# Patient Record
Sex: MALE | Race: WHITE | NOT HISPANIC OR LATINO | Employment: UNEMPLOYED | URBAN - METROPOLITAN AREA
[De-identification: names, ages, dates, MRNs, and addresses within clinical notes are randomized per-mention and may not be internally consistent; named-entity substitution may affect disease eponyms.]

---

## 2017-04-10 ENCOUNTER — TRANSFERRED RECORDS (OUTPATIENT)
Dept: HEALTH INFORMATION MANAGEMENT | Facility: CLINIC | Age: 47
End: 2017-04-10

## 2017-05-02 ENCOUNTER — TRANSFERRED RECORDS (OUTPATIENT)
Dept: HEALTH INFORMATION MANAGEMENT | Facility: CLINIC | Age: 47
End: 2017-05-02

## 2017-05-15 ENCOUNTER — HOSPITAL ENCOUNTER (INPATIENT)
Facility: CLINIC | Age: 47
LOS: 3 days | Discharge: HOME | End: 2017-05-18
Attending: INTERNAL MEDICINE | Admitting: INTERNAL MEDICINE
Payer: MEDICAID

## 2017-05-15 DIAGNOSIS — G40.909 SEIZURE DISORDER (H): Primary | ICD-10-CM

## 2017-05-15 LAB
ALBUMIN SERPL-MCNC: 3.7 G/DL (ref 3.4–5)
ALBUMIN UR-MCNC: NEGATIVE MG/DL
ALP SERPL-CCNC: 67 U/L (ref 40–150)
ALT SERPL W P-5'-P-CCNC: 24 U/L (ref 0–70)
ANION GAP SERPL CALCULATED.3IONS-SCNC: 5 MMOL/L (ref 3–14)
APPEARANCE UR: CLEAR
AST SERPL W P-5'-P-CCNC: 14 U/L (ref 0–45)
BILIRUB SERPL-MCNC: 0.3 MG/DL (ref 0.2–1.3)
BILIRUB UR QL STRIP: NEGATIVE
BUN SERPL-MCNC: 19 MG/DL (ref 7–30)
CALCIUM SERPL-MCNC: 9.1 MG/DL (ref 8.5–10.1)
CHLORIDE SERPL-SCNC: 108 MMOL/L (ref 94–109)
CO2 SERPL-SCNC: 30 MMOL/L (ref 20–32)
COLOR UR AUTO: YELLOW
CREAT SERPL-MCNC: 1.09 MG/DL (ref 0.66–1.25)
ERYTHROCYTE [DISTWIDTH] IN BLOOD BY AUTOMATED COUNT: 12.2 % (ref 10–15)
GFR SERPL CREATININE-BSD FRML MDRD: 72 ML/MIN/1.7M2
GLUCOSE SERPL-MCNC: 83 MG/DL (ref 70–99)
GLUCOSE UR STRIP-MCNC: NEGATIVE MG/DL
HCT VFR BLD AUTO: 40.3 % (ref 40–53)
HGB BLD-MCNC: 14.2 G/DL (ref 13.3–17.7)
HGB UR QL STRIP: NEGATIVE
KETONES UR STRIP-MCNC: NEGATIVE MG/DL
LEUKOCYTE ESTERASE UR QL STRIP: NEGATIVE
MCH RBC QN AUTO: 31.8 PG (ref 26.5–33)
MCHC RBC AUTO-ENTMCNC: 35.2 G/DL (ref 31.5–36.5)
MCV RBC AUTO: 90 FL (ref 78–100)
MUCOUS THREADS #/AREA URNS LPF: PRESENT /LPF
NITRATE UR QL: NEGATIVE
PH UR STRIP: 6.5 PH (ref 5–7)
PLATELET # BLD AUTO: 179 10E9/L (ref 150–450)
POTASSIUM SERPL-SCNC: 3.7 MMOL/L (ref 3.4–5.3)
PROT SERPL-MCNC: 7.2 G/DL (ref 6.8–8.8)
RBC # BLD AUTO: 4.46 10E12/L (ref 4.4–5.9)
RBC #/AREA URNS AUTO: 0 /HPF (ref 0–2)
SODIUM SERPL-SCNC: 143 MMOL/L (ref 133–144)
SP GR UR STRIP: 1.01 (ref 1–1.03)
URN SPEC COLLECT METH UR: ABNORMAL
UROBILINOGEN UR STRIP-MCNC: NORMAL MG/DL (ref 0–2)
WBC # BLD AUTO: 5.9 10E9/L (ref 4–11)
WBC #/AREA URNS AUTO: <1 /HPF (ref 0–2)

## 2017-05-15 PROCEDURE — 36415 COLL VENOUS BLD VENIPUNCTURE: CPT | Performed by: PHYSICIAN ASSISTANT

## 2017-05-15 PROCEDURE — 80175 DRUG SCREEN QUAN LAMOTRIGINE: CPT | Performed by: PSYCHIATRY & NEUROLOGY

## 2017-05-15 PROCEDURE — 81001 URINALYSIS AUTO W/SCOPE: CPT | Performed by: INTERNAL MEDICINE

## 2017-05-15 PROCEDURE — 12000007 ZZH R&B INTERMEDIATE

## 2017-05-15 PROCEDURE — 25000132 ZZH RX MED GY IP 250 OP 250 PS 637: Performed by: PSYCHIATRY & NEUROLOGY

## 2017-05-15 PROCEDURE — 36415 COLL VENOUS BLD VENIPUNCTURE: CPT | Performed by: PSYCHIATRY & NEUROLOGY

## 2017-05-15 PROCEDURE — 95951 ZZHC EEG VIDEO EACH 24 HR: CPT

## 2017-05-15 PROCEDURE — 80053 COMPREHEN METABOLIC PANEL: CPT | Performed by: PHYSICIAN ASSISTANT

## 2017-05-15 PROCEDURE — 40000061 ZZH STATISTIC EEG TIME EA 10 MIN

## 2017-05-15 PROCEDURE — 99207 ZZC APP CREDIT; MD BILLING SHARED VISIT: CPT | Performed by: PHYSICIAN ASSISTANT

## 2017-05-15 PROCEDURE — 85027 COMPLETE CBC AUTOMATED: CPT | Performed by: PHYSICIAN ASSISTANT

## 2017-05-15 PROCEDURE — 80177 DRUG SCRN QUAN LEVETIRACETAM: CPT | Performed by: PSYCHIATRY & NEUROLOGY

## 2017-05-15 PROCEDURE — 99222 1ST HOSP IP/OBS MODERATE 55: CPT | Mod: AI | Performed by: INTERNAL MEDICINE

## 2017-05-15 RX ORDER — LAMOTRIGINE 100 MG/1
200 TABLET ORAL 2 TIMES DAILY
Status: DISCONTINUED | OUTPATIENT
Start: 2017-05-15 | End: 2017-05-17

## 2017-05-15 RX ORDER — LAMOTRIGINE 100 MG/1
300 TABLET ORAL 2 TIMES DAILY
Status: DISCONTINUED | OUTPATIENT
Start: 2017-05-15 | End: 2017-05-15

## 2017-05-15 RX ORDER — ONDANSETRON 2 MG/ML
4 INJECTION INTRAMUSCULAR; INTRAVENOUS EVERY 6 HOURS PRN
Status: DISCONTINUED | OUTPATIENT
Start: 2017-05-15 | End: 2017-05-18 | Stop reason: HOSPADM

## 2017-05-15 RX ORDER — LIDOCAINE 40 MG/G
CREAM TOPICAL
Status: DISCONTINUED | OUTPATIENT
Start: 2017-05-15 | End: 2017-05-18 | Stop reason: HOSPADM

## 2017-05-15 RX ORDER — ONDANSETRON 4 MG/1
4 TABLET, ORALLY DISINTEGRATING ORAL EVERY 6 HOURS PRN
Status: DISCONTINUED | OUTPATIENT
Start: 2017-05-15 | End: 2017-05-18 | Stop reason: HOSPADM

## 2017-05-15 RX ORDER — POLYETHYLENE GLYCOL 3350 17 G/17G
17 POWDER, FOR SOLUTION ORAL DAILY PRN
Status: DISCONTINUED | OUTPATIENT
Start: 2017-05-15 | End: 2017-05-18 | Stop reason: HOSPADM

## 2017-05-15 RX ORDER — PROCHLORPERAZINE 25 MG
25 SUPPOSITORY, RECTAL RECTAL EVERY 12 HOURS PRN
Status: DISCONTINUED | OUTPATIENT
Start: 2017-05-15 | End: 2017-05-18 | Stop reason: HOSPADM

## 2017-05-15 RX ORDER — ACETAMINOPHEN 325 MG/1
650 TABLET ORAL EVERY 4 HOURS PRN
Status: DISCONTINUED | OUTPATIENT
Start: 2017-05-15 | End: 2017-05-15

## 2017-05-15 RX ORDER — NALOXONE HYDROCHLORIDE 0.4 MG/ML
.1-.4 INJECTION, SOLUTION INTRAMUSCULAR; INTRAVENOUS; SUBCUTANEOUS
Status: DISCONTINUED | OUTPATIENT
Start: 2017-05-15 | End: 2017-05-18 | Stop reason: HOSPADM

## 2017-05-15 RX ORDER — LORAZEPAM 2 MG/ML
2 INJECTION INTRAMUSCULAR EVERY 5 MIN PRN
Status: DISCONTINUED | OUTPATIENT
Start: 2017-05-15 | End: 2017-05-18 | Stop reason: HOSPADM

## 2017-05-15 RX ORDER — ACETAMINOPHEN 650 MG/1
650 SUPPOSITORY RECTAL EVERY 4 HOURS PRN
Status: DISCONTINUED | OUTPATIENT
Start: 2017-05-15 | End: 2017-05-18 | Stop reason: HOSPADM

## 2017-05-15 RX ORDER — BISACODYL 10 MG
10 SUPPOSITORY, RECTAL RECTAL DAILY PRN
Status: DISCONTINUED | OUTPATIENT
Start: 2017-05-15 | End: 2017-05-18 | Stop reason: HOSPADM

## 2017-05-15 RX ORDER — LAMOTRIGINE 100 MG/1
200 TABLET ORAL
Status: DISCONTINUED | OUTPATIENT
Start: 2017-05-15 | End: 2017-05-15

## 2017-05-15 RX ORDER — LEVETIRACETAM 750 MG/1
750 TABLET ORAL 2 TIMES DAILY
Status: DISCONTINUED | OUTPATIENT
Start: 2017-05-15 | End: 2017-05-16

## 2017-05-15 RX ORDER — LIDOCAINE 40 MG/G
CREAM TOPICAL
Status: DISCONTINUED | OUTPATIENT
Start: 2017-05-15 | End: 2017-05-18

## 2017-05-15 RX ORDER — AMOXICILLIN 250 MG
1-2 CAPSULE ORAL 2 TIMES DAILY PRN
Status: DISCONTINUED | OUTPATIENT
Start: 2017-05-15 | End: 2017-05-18 | Stop reason: HOSPADM

## 2017-05-15 RX ORDER — ACETAMINOPHEN 325 MG/1
650 TABLET ORAL EVERY 4 HOURS PRN
Status: DISCONTINUED | OUTPATIENT
Start: 2017-05-15 | End: 2017-05-18 | Stop reason: HOSPADM

## 2017-05-15 RX ORDER — GINSENG 100 MG
CAPSULE ORAL 3 TIMES DAILY PRN
Status: DISCONTINUED | OUTPATIENT
Start: 2017-05-15 | End: 2017-05-18 | Stop reason: HOSPADM

## 2017-05-15 RX ORDER — LAMOTRIGINE 100 MG/1
300 TABLET ORAL AT BEDTIME
Status: DISCONTINUED | OUTPATIENT
Start: 2017-05-15 | End: 2017-05-18 | Stop reason: HOSPADM

## 2017-05-15 RX ORDER — PROCHLORPERAZINE MALEATE 5 MG
5-10 TABLET ORAL EVERY 6 HOURS PRN
Status: DISCONTINUED | OUTPATIENT
Start: 2017-05-15 | End: 2017-05-18 | Stop reason: HOSPADM

## 2017-05-15 RX ADMIN — LAMOTRIGINE 300 MG: 100 TABLET ORAL at 22:00

## 2017-05-15 RX ADMIN — LAMOTRIGINE 300 MG: 100 TABLET ORAL at 12:07

## 2017-05-15 RX ADMIN — LEVETIRACETAM 750 MG: 750 TABLET, FILM COATED ORAL at 22:00

## 2017-05-15 RX ADMIN — LAMOTRIGINE 200 MG: 100 TABLET ORAL at 16:37

## 2017-05-15 RX ADMIN — LEVETIRACETAM 750 MG: 750 TABLET, FILM COATED ORAL at 12:07

## 2017-05-15 ASSESSMENT — VISUAL ACUITY
OU: NORMAL ACUITY
OU: NORMAL ACUITY

## 2017-05-15 NOTE — IP AVS SNAPSHOT
[address] [facility]    [address]    [place] [address]    Phone:  [phone]                                       After Visit Summary   5/15/2017    Johnson Crystal    MRN: [id number]           After Visit Summary Signature Page     I have received my discharge instructions, and my questions have been answered. I have discussed any challenges I see with this plan with the nurse or doctor.    ..........................................................................................................................................  Patient/Patient Representative Signature      ..........................................................................................................................................  Patient Representative Print Name and Relationship to Patient    ..................................................               ................................................  Date                                            Time    ..........................................................................................................................................  Reviewed by Signature/Title    ...................................................              ..............................................  Date                                                            Time
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
## 2017-05-15 NOTE — CONSULTS
[facility]    Neurology Consultation     Date of Admission:  5/15/2017  Date of Consult (When I saw the patient): 05/15/17    Assessment & Plan   Johnson Crystal is a 46 year old male who was admitted on 5/15/2017. I was asked to see the patient for intractable epilepsy.    #. Seizures- Epilepsy monitoring  --Admit for continuous video/EEG monitoring  --BID hv, photic and exercise  --basic labs: normal CBC, CMP  --sleep deprivation: 4 hours of sleep a night- hold melatonin for tonight  --seizure precautions as per protocol  --home AEDs:  Will cut down his Keppra to 750 mg BID and decrease lamotrigine to 200/200/300 mgas a provoking factor.  --Tele monitoring: started: NSR thus far  --emergency AED treatment as per usual  --out of bed or chair only with assistance  --continuous pulse ox    #. DVT Prophylaxis  --SCDs and BID exercise  #. Nutrition / GI Prophylaxis  --regular diet      I discussed the above diagnosis, assessment, and further testing with the patient.  The patient is having medically uncontrolled seizures, which poses a significant mortality and morbidity risk. The epilepsy monitoring at [facility] is set up to monitor and record EEGs during seizures and it includes nurses trained to provide first aid and patient safety during seizures. To catch a sufficient number of seizures in a reasonable monitoring time, the patients medications are reduced and other factors are used to induce seizures, making the patient at risk for more severe seizures.The patient therefore is at risk for generalized tonic-clonic seizures and status epilepticus, a well-recognized medical emergency situation. The full hospital team must be present to deal with these medical emergency situations should they arise. The nurses must be present to document the patient's exam during and after the seizure so as to aid in clarifying the nature of the seizure disorder. Only in this way can the medical team hope to make  progress in determining the next steps in treatment. Left untreated, medically refractory seizures have a 10-20% annual morbidity and a 2% annual mortality rate, which makes video EEG monitoring the medically necessary next step for the care of this particular patient.      Florence Khan MD    Code Status    Full Code        Reason for Consult   Reason for consult: I was asked by Joanna Barthell, PA to evaluate this patient for medically intractable seizures.      Chief Complaint   Seizures and medication side effects    History is obtained from the patient, electronic chart    History of Present Illness   Johnson Crystal is a 46 year old male who presents with medically refractory epilepsy and seizure medication side effects.  He started having seizures around 9-10 years old.  These are generalized clonic-tonic seizures, usually without aura.  He has breakthrough seizures throughout his life despite trying multiple medications at high doses.  He currently take Keppra 1500 mg TID and Lamotrigine 300/200/300 mg.  Most recent dose increase was lamotrigine, and he does note side effects after taking his pills.  He also notes cognitive decline over the past few years- being forgetful mainly with short term memory.  Previous workup outpatient was normal EEG and MRI that showed a few areas of hemosiderin deposit- possibly from previous TBI.  He does have a history of a fall from 2nd or 3rd story when a kid.  Last known seizure was Jan 2017.    Goal for this admission is to determine focal vs generalized epilepsy disorder, and localization of seizures if possible.    Past Medical History   I have reviewed this patient's medical history and updated it with pertinent information if needed.   Epilepsy, cognitive decline    Past Surgical History   I have reviewed this patient's surgical history and updated it with pertinent information if needed.  Partial nephrectomy    Prior to Admission Medications   Prior to  Admission Medications   Prescriptions Last Dose Informant Patient Reported? Taking?   LAMOTRIGINE PO 5/15/2017 at 0300  Yes Yes   Sig: Take 200 mg by mouth 300 mg/1.5 tabs qam and hs, 200 mg/1 tab at 1400 daily.   LevETIRAcetam (KEPPRA PO) 5/14/2017 at hs  Yes Yes   Sig: Take 1,500 mg by mouth 3 times daily   MELATONIN PO Past Week at hs  Yes Yes   Sig: Take 5-10 mg by mouth      Facility-Administered Medications: None     Allergies   No Known Allergies    Social History   I have reviewed this patient's social history and updated it with pertinent information if needed. Johnson Crystal  Denies tobacco or alcohol.    Family History   I have reviewed this patient's family history and updated it with pertinent information if needed.   No epilepsy in the family.      Review of Systems   The 10 point Review of Systems is negative other than noted in the HPI.    Physical Exam   Temp: (P) 98.1  F (36.7  C) Temp src: (P) Oral BP: (P) 108/72 Pulse: (P) 51   Resp: (P) 20 SpO2: 95 % O2 Device: None (Room air)    Vital Signs with Ranges  Temp:  [98.1  F (36.7  C)-98.2  F (36.8  C)] (P) 98.1  F (36.7  C)  Pulse:  [51-61] (P) 51  Resp:  [20] (P) 20  BP: (130)/(84) (P) 108/72  SpO2:  [95 %] 95 %  192 lbs 9.6 oz    General Appearance:  No acute distress  Neuro:       Mental Status Exam:   A,A, NAD, fully oriented       Cranial Nerves:   CN 2-12 examined and intact       Motor:   5/5 BUE and BLE       Reflexes:  Normal BUE and BLE       Sensory:  Nl LT x4       Coordination:   fnf normal bilaterally       Gait:  Slightly wide based, otherwise normal  Neck: no nuchal rigidity. No carotid bruits.    Extremities: No clubbing, no cyanosis, no edema  CV: RRR nl s1, s2  Resp: CTAB        Data   Results for orders placed or performed during the hospital encounter of 05/15/17 (from the past 24 hour(s))   CBC with platelets   Result Value Ref Range    WBC 5.9 4.0 - 11.0 10e9/L    RBC Count 4.46 4.4 - 5.9 10e12/L    Hemoglobin 14.2 13.3 - 17.7  g/dL    Hematocrit 40.3 40.0 - 53.0 %    MCV 90 78 - 100 fl    MCH 31.8 26.5 - 33.0 pg    MCHC 35.2 31.5 - 36.5 g/dL    RDW 12.2 10.0 - 15.0 %    Platelet Count 179 150 - 450 10e9/L   Comprehensive metabolic panel   Result Value Ref Range    Sodium 143 133 - 144 mmol/L    Potassium 3.7 3.4 - 5.3 mmol/L    Chloride 108 94 - 109 mmol/L    Carbon Dioxide 30 20 - 32 mmol/L    Anion Gap 5 3 - 14 mmol/L    Glucose 83 70 - 99 mg/dL    Urea Nitrogen 19 7 - 30 mg/dL    Creatinine 1.09 0.66 - 1.25 mg/dL    GFR Estimate 72 >60 mL/min/1.7m2    GFR Estimate If Black 87 >60 mL/min/1.7m2    Calcium 9.1 8.5 - 10.1 mg/dL    Bilirubin Total 0.3 0.2 - 1.3 mg/dL    Albumin 3.7 3.4 - 5.0 g/dL    Protein Total 7.2 6.8 - 8.8 g/dL    Alkaline Phosphatase 67 40 - 150 U/L    ALT 24 0 - 70 U/L    AST 14 0 - 45 U/L           Imaging and procedures:  I personally reviewed these images.  Continuous video EEG: normal EEG so far

## 2017-05-15 NOTE — PHARMACY-ADMISSION MEDICATION HISTORY
Admission medication history interview status for the 5/15/2017  admission is complete. See EPIC admission navigator for prior to admission medications     Medication history source reliability:Good    Actions taken by pharmacist (provider contacted, etc): Copied from med bottles and interviewed pt     Additional medication history information not noted on PTA med list :None    Medication reconciliation/reorder completed by provider prior to medication history? No    Time spent in this activity: 15 min    Prior to Admission medications    Medication Sig Last Dose Taking? Auth Provider   LevETIRAcetam (KEPPRA PO) Take 1,500 mg by mouth 3 times daily 5/14/2017 at hs Yes Unknown, Entered By History   LAMOTRIGINE PO Take 200 mg by mouth 300 mg/1.5 tabs qam and hs, 200 mg/1 tab at 1400 daily. 5/15/2017 at 0300 Yes Unknown, Entered By History   MELATONIN PO Take 5-10 mg by mouth Past Week at hs Yes Unknown, Entered By History

## 2017-05-15 NOTE — PROGRESS NOTES
Day 1 LTV [id number] started on awake,alert and cooperative.  Photic and 3 min hv done.  Dr Khan ordering

## 2017-05-15 NOTE — PROGRESS NOTES
Patient here for EEG monitoring for seizures. A&O x4. Neuros intact. VSS. Tele NSR. Regular diet. Up with A1 + GB. Denies pain. Call put in to Neurologist for clarification on Keppra.  MD ordered 1-750mg tablet 2x/day and patients prescription bottle says to take 2-750mg tablets 3x/day. Continue to monitor and follow POC.
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
## 2017-05-15 NOTE — IP AVS SNAPSHOT
MRN:[id number]                      After Visit Summary   5/15/2017    Johnson Crystal    MRN: [id number]           Thank you!     Thank you for choosing [facility] for your care. Our goal is always to provide you with excellent care. Hearing back from our patients is one way we can continue to improve our services. Please take a few minutes to complete the written survey that you may receive in the mail after you visit with us. Thank you!        Patient Information     Date Of Birth          1970        Designated Caregiver       Most Recent Value    Caregiver    Will someone help with your care after discharge? no      About your hospital stay     You were admitted on:  May 15, 2017 You last received care in the:  [place] [address] [facility]    You were discharged on:  May 18, 2017        Reason for your hospital stay       Seizure monitoring                  Who to Call     For medical emergencies, please call 911.  For non-urgent questions about your medical care, please call your primary care provider or clinic, [phone]          Attending Provider     Provider Specialty    Akira Gauthier MD Internal Medicine       Primary Care Provider Office Phone # Fax #    Earl Covarrubias [phone]       [address] [address]        After Care Instructions     Diet       Follow this diet upon discharge: Orders Placed This Encounter      Regular Diet Adult                  Follow-up Appointments     Follow-up and recommended labs and tests        Follow up with primary care provider, EARL COVARRUBIAS, within 7 days to evaluate medication change.            Follow-up and recommended labs and tests        1) Follow up with Dr. Velasquez of [facility].   2) Follow up with Dr. Yana Narvaez of [facility]                  Your next 10 appointments already scheduled     May 30, 2017  9:30 AM CDT   EEG with ME  "[facility] EEG 3   [facility] ([facility])    [address], [address]  [facility] [address] [phone]            May 30, 2017  1:00 PM CDT   New Patient Visit with Leigh Ann Fong MD   [facility]    [address], [address]  [facility] [address] [phone]              Further instructions from your care team       Dr Velasquez's office at [facility] will call you to set up an appt for a abdominal CT and follow-up appointment. If you do not hear from them, call them at [phone].    Per Dr. 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     Follow-up with the [facility] in 1 month with Dr. Khan. Call [phone] to make an appointment.   Address: [address] [place] [address] [address], [place] [address]            Pending Results     No orders found from 5/13/2017 to 5/16/2017.            Statement of Approval     Ordered          05/18/17 1631  I have reviewed and agree with all the recommendations and orders detailed in this document.  EFFECTIVE NOW     Approved and electronically signed by:  Akira Gauthier MD             Admission Information     Date & Time Provider Department Dept. Phone    5/15/2017 Akira Gauthier MD [address] [facility] [place] [phone]      Your Vitals Were     Blood Pressure Pulse Temperature Respirations Height Weight    119/78 (BP Location: Right arm) 69 98  F (36.7  C) (Oral) 16 1.778 m (5' 10\") 87.4 kg (192 lb 9.6 oz)    Pulse Oximetry BMI (Body Mass Index)                97% 27.64 kg/m2          MyChart Information     [employer] lets you send messages to your doctor, view your test results, renew your prescriptions, schedule appointments and more. To sign up, go to [email or web address] . Click on \"Log in\" on the left side of the screen, which will take you to the Welcome page. Then click on \"Sign up Now\" " on the right side of the page.     You will be asked to enter the access code listed below, as well as some personal information. Please follow the directions to create your username and password.     Your access code is: [email or web address]  Expires: 2017 11:00 AM     Your access code will  in 90 days. If you need help or a new code, please call your [place] clinic or [phone].        Care EveryWhere ID     This is your Care EveryWhere ID. This could be used by other organizations to access your [place] medical records  [id number]           Review of your medicines      CONTINUE these medicines which may have CHANGED, or have new prescriptions. If we are uncertain of the size of tablets/capsules you have at home, strength may be listed as something that might have changed.        Dose / Directions    * lamoTRIgine 200 MG tablet   Commonly known as:  LaMICtal   This may have changed:    - medication strength  - when to take this  - additional instructions        Dose:  200 mg   Take 1 tablet (200 mg) by mouth 2 times daily   Quantity:  60 tablet   Refills:  0       * lamoTRIgine 150 MG tablet   Commonly known as:  LaMICtal   This may have changed:  You were already taking a medication with the same name, and this prescription was added. Make sure you understand how and when to take each.        Dose:  300 mg   Take 2 tablets (300 mg) by mouth At Bedtime   Quantity:  30 tablet   Refills:  0       * Notice:  This list has 2 medication(s) that are the same as other medications prescribed for you. Read the directions carefully, and ask your doctor or other care provider to review them with you.      CONTINUE these medicines which have NOT CHANGED        Dose / Directions    KEPPRA PO        Dose:  1500 mg   Take 1,500 mg by mouth 3 times daily   Refills:  0       MELATONIN PO   Notes to Patient:  Resume home med schedule        Dose:  5-10 mg   Take 5-10 mg by mouth   Refills:  0            Where to get your  medicines      These medications were sent to [facility] [place], [place] - [address]  [address] [place] [address]     Phone:  [phone]     lamoTRIgine 150 MG tablet    lamoTRIgine 200 MG tablet                Protect others around you: Learn how to safely use, store and throw away your medicines at www.disposemymeds.org.             Medication List: This is a list of all your medications and when to take them. Check marks below indicate your daily home schedule. Keep this list as a reference.      Medications           Morning Afternoon Evening Bedtime As Needed    KEPPRA PO   Take 1,500 mg by mouth 3 times daily   Last time this was given:  1,500 mg on 5/18/2017  4:00 PM   Next Dose Due:  5/18/17 at 10pm            9am       4pm           10pm           * lamoTRIgine 200 MG tablet   Commonly known as:  LaMICtal   Take 1 tablet (200 mg) by mouth 2 times daily   Last time this was given:  200 mg on 5/18/2017 12:21 PM   Next Dose Due:  5/19/17 at 8am            8am       2pm                   * lamoTRIgine 150 MG tablet   Commonly known as:  LaMICtal   Take 2 tablets (300 mg) by mouth At Bedtime   Last time this was given:  200 mg on 5/18/2017 12:21 PM   Next Dose Due:  5/18/17 at 9pm                        9pm           MELATONIN PO   Take 5-10 mg by mouth   Notes to Patient:  Resume home med schedule                                      * Notice:  This list has 2 medication(s) that are the same as other medications prescribed for you. Read the directions carefully, and ask your doctor or other care provider to review them with you.

## 2017-05-16 LAB
LAMOTRIGINE SERPL-MCNC: 12.8 UG/ML
LEVETIRACETAM SERPL-MCNC: 21 UG/ML

## 2017-05-16 PROCEDURE — 12000007 ZZH R&B INTERMEDIATE

## 2017-05-16 PROCEDURE — 95951 ZZHC EEG VIDEO EACH 24 HR: CPT

## 2017-05-16 PROCEDURE — 99231 SBSQ HOSP IP/OBS SF/LOW 25: CPT | Performed by: INTERNAL MEDICINE

## 2017-05-16 PROCEDURE — 25000132 ZZH RX MED GY IP 250 OP 250 PS 637: Performed by: PSYCHIATRY & NEUROLOGY

## 2017-05-16 PROCEDURE — 99207 ZZC CDG-MDM COMPONENT: MEETS LOW - DOWN CODED: CPT | Performed by: INTERNAL MEDICINE

## 2017-05-16 RX ADMIN — LEVETIRACETAM 750 MG: 750 TABLET, FILM COATED ORAL at 09:12

## 2017-05-16 RX ADMIN — LAMOTRIGINE 200 MG: 100 TABLET ORAL at 09:12

## 2017-05-16 RX ADMIN — LAMOTRIGINE 200 MG: 100 TABLET ORAL at 11:46

## 2017-05-16 RX ADMIN — LAMOTRIGINE 300 MG: 100 TABLET ORAL at 21:58

## 2017-05-16 ASSESSMENT — VISUAL ACUITY
OU: NORMAL ACUITY

## 2017-05-16 NOTE — PLAN OF CARE
Problem: Goal Outcome Summary  Goal: Goal Outcome Summary  Outcome: No Change  DA today for continuous EEG monitoring under Dr. Yana Narvaez. A&0x4, pt states he is forgetful and has ST memory loss at times. Neuros intact. No seizure activity this shift. Denies pain. Bedrest with BRP, up with SBA/GB. Tolerating a regular diet. Tele NSR. Informed pt of sleep deprivation overnight.

## 2017-05-16 NOTE — PLAN OF CARE
Problem: Goal Outcome Summary  Goal: Goal Outcome Summary  Outcome: No Change  Patient here for EEG monitoring for seizures. A&O x4. Neuros intact. VSS. Tele NSR. Regular diet. Up with A1 + GB. Denies pain. Per MD orders patient to be sleep deprived tonight. No events witnessed or reported on this shift. Continue to monitor and follow POC.
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
## 2017-05-16 NOTE — PLAN OF CARE
Problem: Goal Outcome Summary  Goal: Goal Outcome Summary  Outcome: Improving  Admitted for 24 hr EEG monitoring. VSS. A&O. Neuros intact, forgetful at times. No sz activity noted. Sz precautions in place. Tele SD. Regular diet. Bedrest with BR privileges, SBA. Denies pain. Only had 4 hours of sleep tonight. D/c pending. Will continue to monitor.
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
## 2017-05-17 LAB — LAMOTRIGINE FREE LEVEL: 4.4

## 2017-05-17 PROCEDURE — 12000007 ZZH R&B INTERMEDIATE

## 2017-05-17 PROCEDURE — 25000132 ZZH RX MED GY IP 250 OP 250 PS 637: Performed by: PSYCHIATRY & NEUROLOGY

## 2017-05-17 PROCEDURE — 99207 ZZC CDG-MDM COMPONENT: MEETS LOW - DOWN CODED: CPT | Performed by: INTERNAL MEDICINE

## 2017-05-17 PROCEDURE — 95951 ZZHC EEG VIDEO EACH 24 HR: CPT

## 2017-05-17 PROCEDURE — 40000061 ZZH STATISTIC EEG TIME EA 10 MIN

## 2017-05-17 PROCEDURE — 25000132 ZZH RX MED GY IP 250 OP 250 PS 637: Performed by: PHYSICIAN ASSISTANT

## 2017-05-17 PROCEDURE — 99231 SBSQ HOSP IP/OBS SF/LOW 25: CPT | Performed by: INTERNAL MEDICINE

## 2017-05-17 RX ORDER — LAMOTRIGINE 100 MG/1
200 TABLET ORAL 2 TIMES DAILY
Status: DISCONTINUED | OUTPATIENT
Start: 2017-05-17 | End: 2017-05-18 | Stop reason: HOSPADM

## 2017-05-17 RX ORDER — LAMOTRIGINE 100 MG/1
200 TABLET ORAL DAILY
Status: DISCONTINUED | OUTPATIENT
Start: 2017-05-18 | End: 2017-05-17

## 2017-05-17 RX ORDER — LEVETIRACETAM 750 MG/1
1500 TABLET ORAL 3 TIMES DAILY
Status: DISCONTINUED | OUTPATIENT
Start: 2017-05-17 | End: 2017-05-18 | Stop reason: HOSPADM

## 2017-05-17 RX ADMIN — LAMOTRIGINE 200 MG: 100 TABLET ORAL at 08:13

## 2017-05-17 RX ADMIN — LAMOTRIGINE 200 MG: 100 TABLET ORAL at 13:16

## 2017-05-17 RX ADMIN — LEVETIRACETAM 1500 MG: 750 TABLET, FILM COATED ORAL at 13:16

## 2017-05-17 RX ADMIN — ACETAMINOPHEN 650 MG: 325 TABLET, FILM COATED ORAL at 15:28

## 2017-05-17 RX ADMIN — LAMOTRIGINE 300 MG: 100 TABLET ORAL at 21:52

## 2017-05-17 RX ADMIN — LEVETIRACETAM 1500 MG: 750 TABLET, FILM COATED ORAL at 21:52

## 2017-05-17 RX ADMIN — LEVETIRACETAM 1500 MG: 750 TABLET, FILM COATED ORAL at 17:21

## 2017-05-17 ASSESSMENT — VISUAL ACUITY
OU: NORMAL ACUITY

## 2017-05-17 NOTE — PLAN OF CARE
Problem: Goal Outcome Summary  Goal: Goal Outcome Summary  Outcome: No Change  A&O times 4. Some short term memory loss noted otherwise neuros intact. VSS. Tele NSR. Regular diet. Up with assist of one. Voiding adequately in bathroom. Patient on video EEG monitoring. No seizure activity witnessed. Anti-seizure medications resumed this afternoon. Denies pain. Continue to monitor.
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
## 2017-05-17 NOTE — PROGRESS NOTES
[facility]    Oncology Progress Note    Date of Service (when I saw the patient): 05/17/2017     Assessment & Plan   Johnson Crystal is a 46 year old male who was admitted on 5/15/2017.      Left renal Oncocytoma  -s/p partial nephrectomy 9/2016  -has failed prior follow up but no clinical evidence of recurrence  -urinalysis without hematuria  -plan CT abdomen and follow up with me after discharge      Code Status: Full Code    lJ Velasquez    Interval History   No new concerns    Physical Exam   Temp: 97.3  F (36.3  C) Temp src: Axillary BP: 123/76 Pulse: 66   Resp: 16 SpO2: 96 % O2 Device: None (Room air)    Vitals:    05/15/17 0941   Weight: 87.4 kg (192 lb 9.6 oz)     Vital Signs with Ranges  Temp:  [97.3  F (36.3  C)-98.7  F (37.1  C)] 97.3  F (36.3  C)  Pulse:  [55-87] 66  Resp:  [16] 16  BP: (120-133)/(76-85) 123/76  SpO2:  [94 %-96 %] 96 %  I/O last 3 completed shifts:  In: 480 [P.O.:480]  Out: -     Constitutional: awake, alert, cooperative, no apparent distress    Medications        [START ON 5/18/2017] lamoTRIgine  200 mg Oral Daily     sodium chloride (PF)  3 mL Intracatheter Q8H     lamoTRIgine  300 mg Oral At Bedtime       Data   No results found for this or any previous visit (from the past 24 hour(s)).

## 2017-05-17 NOTE — PROGRESS NOTES
[facility]    Neurology Progress Note    Date of Service (when I saw the patient): 05/17/2017     Today's developments: Normal EEG so far.  No auras or seizures.  Stopped Keppra last night.  Will skip mid-day dose of lamotrigine tonight.  Continue monitoring.    Assessment & Plan   Johnson Crystal is a 46 year old male who was admitted on 5/15/2017. I was asked to see the patient for intractable epilepsy.      #. Seizures- Epilepsy monitoring  --Continuous video/EEG monitoring  --BID hv, photic and exercise  --basic labs: normal CBC, CMP on admission  --stop sleep deprivation- patient can sleep when he wishes.  --seizure precautions as per protocol  --home AEDs: Stopped Keppra, decreased lamotrigine to 200/0/300 mg as a provoking factor.  Plan for tomorrow restart full dose PTA meds by noon- will consider decreasing lmt a little to 200/200/300 though for patient tolerance  --Tele monitoring: NSR thus far  --emergency AED treatment as per usual  --out of bed or chair only with assistance  --continuous pulse ox      #. DVT Prophylaxis  --SCDs and BID exercise  #. Nutrition / GI Prophylaxis  --regular diet          I discussed the above diagnosis, assessment, and further testing with the patient.  The patient is having medically uncontrolled seizures, which poses a significant mortality and morbidity risk. The epilepsy monitoring at [facility] is set up to monitor and record EEGs during seizures and it includes nurses trained to provide first aid and patient safety during seizures. To catch a sufficient number of seizures in a reasonable monitoring time, the patients medications are reduced and other factors are used to induce seizures, making the patient at risk for more severe seizures.The patient therefore is at risk for generalized tonic-clonic seizures and status epilepticus, a well-recognized medical emergency situation. The full hospital team must be present to deal with these medical emergency  situations should they arise. The nurses must be present to document the patient's exam during and after the seizure so as to aid in clarifying the nature of the seizure disorder. Only in this way can the medical team hope to make progress in determining the next steps in treatment. Left untreated, medically refractory seizures have a 10-20% annual morbidity and a 2% annual mortality rate, which makes video EEG monitoring the medically necessary next step for the care of this particular patient.      Florence Khan MD       Interval History   Johnson denies any auras or seizures.  He is comfortable with monitoring and the set up in the hospital.  No pain.  No neuro changes.    Physical Exam   Temp: 97.3  F (36.3  C) Temp src: Axillary BP: 123/76 Pulse: 66   Resp: 16 SpO2: 96 % O2 Device: None (Room air)    Vitals:    05/15/17 0941   Weight: 87.4 kg (192 lb 9.6 oz)     Vital Signs with Ranges  Temp:  [97.3  F (36.3  C)-98.7  F (37.1  C)] 97.3  F (36.3  C)  Pulse:  [55-87] 66  Resp:  [16] 16  BP: (120-133)/(76-85) 123/76  SpO2:  [94 %-96 %] 96 %  I/O last 3 completed shifts:  In: 480 [P.O.:480]  Out: -       General Appearance:  No acute distress  Neuro:       Mental Status Exam:   A,A, fully oriented       Cranial Nerves:  EOMI, face equal and symmetric, speech normal       Motor:   5/5 BUE and BLE       Sensory:  Nl LT x4       Coordination:  fnf normal bilaterally  CV: RRR nl s1, s2  Resp: CTAB    Extremities: No clubbing, no cyanosis, no edema    Medications        [START ON 5/18/2017] lamoTRIgine  200 mg Oral Daily     sodium chloride (PF)  3 mL Intracatheter Q8H     lamoTRIgine  300 mg Oral At Bedtime       Data   No results found for this or any previous visit (from the past 24 hour(s)).      Images and Procedures:  I personally reviewed the images of the following studies:  Prolonged video-EEG: normal EEG so far.

## 2017-05-17 NOTE — PROGRESS NOTES
Day 2 LTV completed O41-030F  And Day 3 LTV M58-708I started with photic, 3min HV and exercise.  Dr Khan ordering.

## 2017-05-17 NOTE — PROCEDURES
LONG TERM ELECTROENCEPHALOGRAM WITH VIDEO       ORDERING PHYSICIAN:  Dr. Khan      [facility] EEG # [id number] LTV -- Day 1, started on 05/15/2017, stopped 2017       This is a continuous video-EEG performed on Lucien Crystal, in the standard International 10-20 electrode system.  The background activity during wakefulness is mainly in the alpha frequency and is symmetric.  There is a posterior dominant rhythm of 11 Hz bilaterally with quiet wakefulness and eye closure.  Drowsiness was recorded, with dropout of posterior dominant rhythm and increased theta frequency activity.  Sleep was recorded with normal POSTS, vertex waves, and K complexes.  Photic stimulation and hyperventilation and bedside exercise was done and no abnormalities occurred during the seizures.  The patient did not have any clinical seizures, auras, or unusual sensations during the recording.      IMPRESSION:  This is a normal EEG during wakefulness, drowsiness and sleep.  The patient did not have any clinical seizures, auras and no epileptiform discharges or seizures occurred on the EEG recording.  The patient was agreeable to remain another day for continued monitoring.         FREDI KHAN MD             D: 2017 13:20   T: 2017 06:56   MT: dimitri      Name:     LUCIEN CRYSTAL   MRN:      [id number]        Account:        [id number]   :      1970           Procedure Date: 05/15/2017      Document: [id number]

## 2017-05-17 NOTE — PLAN OF CARE
Problem: Goal Outcome Summary  Goal: Goal Outcome Summary  Outcome: No Change  Pt a/ox4 with STM loss/forgetfulness noted. CMS and neuros intact. On tele in SR. Sz precautions with no sz activity noted. EEG tech preformed last daily photic, HV and exercise at shift change. 24h EEG remains in place. Up with SBA. Denies pain. Plan for last night of 4h sleep over NOC, pt aware and compliant with sleep restrictions. D/c plan pending.

## 2017-05-17 NOTE — PLAN OF CARE
Problem: Goal Outcome Summary  Goal: Goal Outcome Summary  Outcome: Improving  A&Ox4. Neuros intact. VSS on RA. Tele NSR. Regular diet. Up with SBA.  Voiding adequately in BR.  No BM this shift.  Denies pain.  Seizure precautions, no seizure activity noted this shift.  Continue to monitor.

## 2017-05-18 VITALS
HEART RATE: 69 BPM | OXYGEN SATURATION: 97 % | RESPIRATION RATE: 16 BRPM | HEIGHT: 70 IN | TEMPERATURE: 98 F | BODY MASS INDEX: 27.57 KG/M2 | SYSTOLIC BLOOD PRESSURE: 119 MMHG | WEIGHT: 192.6 LBS | DIASTOLIC BLOOD PRESSURE: 78 MMHG

## 2017-05-18 PROCEDURE — 40000061 ZZH STATISTIC EEG TIME EA 10 MIN

## 2017-05-18 PROCEDURE — 99239 HOSP IP/OBS DSCHRG MGMT >30: CPT | Performed by: INTERNAL MEDICINE

## 2017-05-18 PROCEDURE — 25000128 H RX IP 250 OP 636: Performed by: PSYCHIATRY & NEUROLOGY

## 2017-05-18 PROCEDURE — 25000132 ZZH RX MED GY IP 250 OP 250 PS 637: Performed by: PSYCHIATRY & NEUROLOGY

## 2017-05-18 RX ORDER — LAMOTRIGINE 150 MG/1
300 TABLET ORAL AT BEDTIME
Qty: 30 TABLET | Refills: 0 | Status: SHIPPED | OUTPATIENT
Start: 2017-05-18 | End: 2017-05-30

## 2017-05-18 RX ORDER — LAMOTRIGINE 200 MG/1
200 TABLET ORAL 2 TIMES DAILY
Qty: 60 TABLET | Refills: 0 | Status: SHIPPED | OUTPATIENT
Start: 2017-05-18 | End: 2017-05-30

## 2017-05-18 RX ADMIN — LAMOTRIGINE 200 MG: 100 TABLET ORAL at 08:06

## 2017-05-18 RX ADMIN — LEVETIRACETAM 1500 MG: 100 INJECTION, SOLUTION INTRAVENOUS at 08:58

## 2017-05-18 RX ADMIN — LEVETIRACETAM 1500 MG: 750 TABLET, FILM COATED ORAL at 08:06

## 2017-05-18 RX ADMIN — LEVETIRACETAM 1500 MG: 750 TABLET, FILM COATED ORAL at 16:00

## 2017-05-18 RX ADMIN — LAMOTRIGINE 200 MG: 100 TABLET ORAL at 12:21

## 2017-05-18 ASSESSMENT — VISUAL ACUITY
OU: NORMAL ACUITY

## 2017-05-18 NOTE — PLAN OF CARE
Problem: Goal Outcome Summary  Goal: Goal Outcome Summary  Outcome: Improving  A&O, forgetful. Neuros intact. VSS. Tele NSR. regular diet. Up with SBA. szr precautions, no szr activity noted. Denies pain. Plan d/c EEG at 1500 and for pt to d/c home at 1800 with wife.         Pt d/c'd home. Will have f/u appts with neuro, oncology and PCP. Sent with new dose of Lamictal medication. All d/c questions answered.

## 2017-05-18 NOTE — DISCHARGE INSTRUCTIONS
Dr Velasquez's office at [facility] will call you to set up an appt for a abdominal CT and follow-up appointment. If you do not hear from them, call them at [phone].    Per Dr. 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     Follow-up with the [facility] in 1 month with Dr. Khan. Call [phone] to make an appointment.   Address: [address] [place] [address] [address], [place] [address]

## 2017-05-18 NOTE — PROCEDURES
LONG TERM ELECTROENCEPHALOGRAM WITH VIDEO       ORDERING PHYSICIAN:  Fredi Khan MD      [facility] EEG # [id number], Day 3 started on 2017, stopped 2017.        This is a digital continuous video-EEG done in the standard 10-20 electrode system with  additional subtemporal leads.  The background activity during wakefulness is mainly in the alpha frequency and is symmetric.  There is posterior dominant rhythm of 9 Hz bilaterally with quiet wakefulness with eye closure.  No significant abnormalities occurred during wakefulness.  The patient, Johnson Crystal, does become drowsy with dropout of the posterior dominant rhythm and increased theta frequency activity.  During sleep activity, there were generalized spike and wave discharges along with generalized polyspike and wave discharges that tended to be maximal bifrontal, but had a wide, generalized field.  There was no clinical correlation to these discharges.  Some of them occurred singly, some of them in couplets and rarely some of them in a continued discharge lasting up to 2 seconds.  In the run of discharges, the frequency was about 3-4 Hz.  The patient did not have any clinical seizures.  There were no electrographic discharges greater than 2 seconds.      IMPRESSION:  This is an abnormal EEG due to generalized spike and wave and polyspike and wave discharges.  These occurred during sleep.  This is consistent with a generalized seizure disorder.  No clinical correlation occurred with these discharges.  None of the discharges lasted more than 2 seconds.  No other abnormalities occurred.  No focal abnormalities are present.  The patient's medications were restarted on this day.         FREDI KHAN MD             D: 2017 07:29   T: 2017 11:16   MT: dimitri      Name:     JOHNSON CRYSTAL   MRN:      [id number]        Account:        [id number]   :      1970           Procedure Date: 2017      Document: [id number]

## 2017-05-18 NOTE — PROCEDURES
LONG TERM ELECTROENCEPHALOGRAM WITH VIDEO       ORDERING PHYSICIAN:  Fredi Khan MD      [facility] EEG # [id number], Day 2 started on 2017, stopped on 2017        This is a digital continuous video-EEG performed in the standard International 10-20 electrode system on Johnson Crystal.  Additional subtemporal leads were recorded.  The background activity during wakefulness is mainly alpha frequency and is symmetric.  There is a posterior dominant rhythm of 9-10 Hz bilaterally during quiet wakefulness with eye closure.  Towards the end of the recording, there were 2 individual generalized spike and wave discharges, maximal bifrontal.  No other epileptiform activity or abnormalities were seen.  The patient did become drowsy with dropout of posterior dominant rhythm and increased theta frequency activity.  Sleep was recorded with normal POSTS, vertex waves, and K complexes.  Hyperventilation and photic stimulation and exercise were performed twice each day and no additional abnormalities were seen during those procedures.      IMPRESSION:  This is an abnormal EEG due to 2 individual generalized spike and wave discharges, maximal bifrontal towards the end of the recording period.  No other abnormalities were seen.  The patient did not experience any seizures or auras during the recording.  No electrographic seizures occurred.  The patient was agreeable for continued monitoring.         FREDI KHAN MD             D: 2017 16:16   T: 2017 09:12   MT: dimitri      Name:     JOHNSON CRYSTAL   MRN:      [id number]        Account:        [id number]   :      1970           Procedure Date: 2017      Document: [id number]

## 2017-05-18 NOTE — PROGRESS NOTES
Oncology chart check for Dr. Velasquez:    No new recommendations.  Please see Dr. Velasquez's note from 5/17/2017.  Plan is for outpatient CT abdomen and to follow up with Dr. Velasquez with the CT results.  Please call with questions, if any.

## 2017-05-18 NOTE — PLAN OF CARE
Problem: Goal Outcome Summary  Goal: Goal Outcome Summary  Outcome: Improving  Admitted for 24 hr EEG monitoring. A&O. VSS. Neuros intact except forgetful at times. Denies numbness/tingling. Tele NSR. Regular diet. Up SBA. Voiding in BR. Denies pain. D/c pending. Will continue to monitor.

## 2017-05-18 NOTE — PROGRESS NOTES
[facility]    Neurology Progress Note    Date of Service (when I saw the patient): 05/18/2017     Today's developments: generalized spike and wave discharges during sleep- consistent with a generalized seizure disorder.  With this finding, a clinical seizure would not give much more information, so home dose medications were restarted, with slight decrease in lamotrigine dose (take away 100 mg in the am), due to patient side effects.  Likely ok to d/c today- will give loading dose Keppra to cover him given the few days completely off Keppra.  I will bring in letter for back to work in one week, and card for program for people with epilepsy.    I will monitor EEG today- will give ok to go home if EEG is looking ok around 2-3 pm today.      Assessment & Plan   Johnson Crystal is a 46 year old male who was admitted on 5/15/2017. I was asked to see the patient for intractable epilepsy.      #. Seizures- Epilepsy monitoring  --Continuous video/EEG monitoring- shows generalized spike and wave and poly spike and wave discharges  --Can stop BID photic and HV.  Do continue BID exercise  --basic labs: normal CBC, CMP on admission  --stop sleep deprivation- patient can sleep when he wishes.  --seizure precautions as per protocol  --home AEDs: Stopped Keppra, restarted full dose PTA meds on 5-17 at noon- will consider decreasing lmt a little to 200/200/300 though for patient tolerance  --Tele monitoring: NSR thus far  --emergency AED treatment as per usual  --out of bed or chair only with assistance  --continuous pulse ox      #. DVT Prophylaxis  --SCDs and BID exercise  #. Nutrition / GI Prophylaxis  --regular diet          I discussed the above diagnosis, assessment, and further testing with the patient.    The patient is having medically uncontrolled seizures, which poses a significant mortality and morbidity risk. The epilepsy monitoring at [facility] is set up to monitor and record EEGs during seizures and it  includes nurses trained to provide first aid and patient safety during seizures. To catch a sufficient number of seizures in a reasonable monitoring time, the patients medications are reduced and other factors are used to induce seizures, making the patient at risk for more severe seizures.The patient therefore is at risk for generalized tonic-clonic seizures and status epilepticus, a well-recognized medical emergency situation. The full hospital team must be present to deal with these medical emergency situations should they arise. The nurses must be present to document the patient's exam during and after the seizure so as to aid in clarifying the nature of the seizure disorder. Only in this way can the medical team hope to make progress in determining the next steps in treatment. Left untreated, medically refractory seizures have a 10-20% annual morbidity and a 2% annual mortality rate, which makes video EEG monitoring the medically necessary next step for the care of this particular patient.    Total time 35 minutes with over 50% face to face counseling and coordination of care.      Florence Khan MD          Interval History    No complaints, no pain, no clinical seizures or auras.    Physical Exam   Temp: 97.7  F (36.5  C) Temp src: Oral BP: 114/75 Pulse: 58   Resp: 14 SpO2: 98 % O2 Device: None (Room air)    Vitals:    05/15/17 0941   Weight: 87.4 kg (192 lb 9.6 oz)     Vital Signs with Ranges  Temp:  [97.3  F (36.3  C)-98.4  F (36.9  C)] 97.7  F (36.5  C)  Pulse:  [56-74] 58  Resp:  [14-16] 14  BP: (114-126)/(63-76) 114/75  SpO2:  [94 %-98 %] 98 %  I/O last 3 completed shifts:  In: 600 [P.O.:600]  Out: -       General Appearance:  No acute distress  Neuro:       Mental Status Exam:   A,A, fully oriented       Cranial Nerves:  EOMI, face equal and symmetric       Motor:  5/5 BUE and BLE  CV: RRR nl s1, s2  Resp: CTAB    Extremities: No clubbing, no cyanosis, no edema    Medications         levETIRAcetam  1,500 mg Oral TID     lamoTRIgine  200 mg Oral BID 09 12     sodium chloride (PF)  3 mL Intracatheter Q8H     lamoTRIgine  300 mg Oral At Bedtime       Data   No results found for this or any previous visit (from the past 24 hour(s)).      Images and Procedures:  I personally reviewed the images of the following studies:  Generalized spike and polyspike wave discharges in sleep, no more than 2 seconds long runs of discharges, no clinical correlate.

## 2017-05-18 NOTE — PLAN OF CARE
Problem: Goal Outcome Summary  Goal: Goal Outcome Summary  Outcome: Improving  A&O times 4. Neuros intact, except forgetfulness noted. VSS. Tele Sinus bradycardia. Regular diet, good appetite. Up with SBA. On EEG video monitoring. Denies pain. Plan to discharge this afternoon, pending today's EEG readings.

## 2017-05-19 NOTE — DISCHARGE SUMMARY
DATE OF ADMISSION:  05/15/2017.      DATE OF DISCHARGE:  05/18/2017.      DISCHARGE DIAGNOSES:   1.  Seizure disorder.   2.  History of left renal oncocytoma.      DISCHARGE MEDICATIONS:   1.  Lamotrigine 200 mg twice a day and 300 mg at night.   2.  Keppra 1500 mg 3 times a day.   3.  Melatonin 5-10 mg at night for sleep as needed.      HOSPITAL COURSE:  Johnson Crystal is a 46-year-old gentleman with a history of seizure disorder who was brought in for an EEG monitoring and adjustment of his antiepileptic medications.  The patient was found to have generalized spike and wave discharges during sleep, consistent with a generalized seizure disorder.      Plan is for further followup of his neurologist, Dr. Yana Narvaez at the [facility].  While here, patient reported that he had a history of renal tumor resection and was not compliant with outpatient followup.  We did consult with [facility] and he was seen by Dr. Velasquez.  His left partial nephrectomy was in 09/2016.  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  This was considered a benign neoplasm, it was recommended the patient have repeat imaging annually for 2-3 years and further outpatient followup with Dr. Velasquez.  As the patient was unable to get CT imaging while he was undergoing an EEG monitoring and thus will be set up with Dr. Velasquez as an outpatient.        PHYSICAL EXAMINATION:   GENERAL:  On the day of discharge, the patient is awake, alert and oriented x3, in no acute distress.   LUNGS:  Clear.   CARDIOVASCULAR:  Regular rate and rhythm, S1, S2.   ABDOMEN:  Bowel sounds positive, nontender, nondistended.   EXTREMITIES:  No edema.   NEUROLOGIC:  Cranial nerves II through XII are intact.  No focal deficits.      DISCHARGE INSTRUCTIONS:  The patient will discharge to home, follow up with his own primary care physician.  Follow up with  Dr. Velasquez of Oncology and follow up with Dr. Yana Narvaez of Neurology.      Thirty five minutes spent on discharge planning and cares.         BEAR KAT MD             D: 2017 16:38   T: 2017 06:19   MT: ARTHUR#[id number]      Name:     LUCIEN KING   MRN:      [id number]        Account:        [id number]   :      1970           Admit Date:     [id number]                                  Discharge Date: 2017      Document: [id number]

## 2017-05-20 NOTE — PROCEDURES
LONG TERM ELECTROENCEPHALOGRAM WITH VIDEO      ORDERING PHYSICIAN:  Dr. Khan      [facility] EEG # [id number], day #3, from 2017 to 2017.      This is a continuous video-EEG performed on Lucien Crystal in the standard International 10-20 electrode system, with additional subtemporal leads bilaterally.  The awake reporting shows mainly alpha frequency activity, with a posterior dominant rhythm of 9-10 Hz bilaterally with quiet wakefulness and eye closure.  Drowsiness was recorded with dropout of the posterior dominant rhythm and increased theta frequency activity.  Sleep was recorded, with normal POSTS, vertex waves, and K complexes.  Intermittent and frequent generalized spike and wave and polyspike and wave discharges were seen at times lasting up to 1 second.  They are maximal in the bifrontal leads.  There were approximately 3-4 Hz in frequency.  They more often occurred in singlets or couplets than in prolonged discharges.  There is no clinical correlation to these discharges.  These discharges are not seen during the awake recording.  The patient had no clinical seizures or auras.      IMPRESSION:  This is an abnormal EEG due to generalized spike-wave and polyspike wave discharges with a frequency at 3-4 Hz seen during sleep.  They are infrequent.  The patient did not have any clinical seizures and there were clinical correlates to these discharges.  This EEG recording along with the previous day's EEG days' EEG recordings is consistent with a generalized epilepsy.  The patient did not have any seizures during the monitoring process.  The medication was started at full dose the previous day and the patient was discharged home on this day.         FREDI KHAN MD             D: 2017 09:47   T: 2017 10:27   MT: CD      Name:     LUCIEN CRYSTAL   MRN:      [id number]        Account:        [id number]   :      1970           Procedure Date: 2017       Document: [id number]

## 2017-05-30 ENCOUNTER — ALLIED HEALTH/NURSE VISIT (OUTPATIENT)
Dept: NEUROLOGY | Facility: CLINIC | Age: 47
End: 2017-05-30

## 2017-05-30 ENCOUNTER — OFFICE VISIT (OUTPATIENT)
Dept: NEUROLOGY | Facility: CLINIC | Age: 47
End: 2017-05-30

## 2017-05-30 VITALS
DIASTOLIC BLOOD PRESSURE: 90 MMHG | WEIGHT: 188.2 LBS | HEIGHT: 70 IN | HEART RATE: 62 BPM | SYSTOLIC BLOOD PRESSURE: 130 MMHG | BODY MASS INDEX: 26.94 KG/M2

## 2017-05-30 DIAGNOSIS — G40.909 SEIZURE DISORDER (H): ICD-10-CM

## 2017-05-30 DIAGNOSIS — G40.909 SEIZURE DISORDER (H): Primary | ICD-10-CM

## 2017-05-30 RX ORDER — LAMOTRIGINE 150 MG/1
300 TABLET ORAL EVERY EVENING
Qty: 60 TABLET | Refills: 3 | Status: SHIPPED | OUTPATIENT
Start: 2017-05-30 | End: 2017-12-01

## 2017-05-30 RX ORDER — DIVALPROEX SODIUM 500 MG/1
500 TABLET, EXTENDED RELEASE ORAL 2 TIMES DAILY
Qty: 60 TABLET | Refills: 3 | Status: SHIPPED | OUTPATIENT
Start: 2017-05-30 | End: 2017-09-21

## 2017-05-30 RX ORDER — LAMOTRIGINE 200 MG/1
200 TABLET ORAL
Qty: 30 TABLET | Refills: 3 | Status: SHIPPED | OUTPATIENT
Start: 2017-05-30 | End: 2017-08-10

## 2017-05-30 RX ORDER — DIVALPROEX SODIUM 250 MG/1
TABLET, EXTENDED RELEASE ORAL
Qty: 60 TABLET | Refills: 3 | Status: SHIPPED | OUTPATIENT
Start: 2017-05-30 | End: 2017-10-02

## 2017-05-30 NOTE — LETTER
2017       RE: Johnson Crystal  : 1970   MRN: [id number]      Dear Colleague,    Thank you for referring your patient, Johnson Crystal, to the [facility]PILEPSY [facility]ARE at [facility]. Please see a copy of my visit note below.    INTRODUCTION:  The patient is a 47-year-old, right-handed male who was referred by Dr. Florence Khan for evaluation of seizures.      HISTORY OF PRESENT ILLNESS:  The patient started having seizures in his childhood.  He does not remember the exact age.  [place], and he fell off a barn loft at least 3 times and hit his head on the concrete.  He does not think he lost consciousness, and he did not seek any medical attention.  He was on Dilantin for many years, but he still had occasional staring spells.  Since he was a child, he has had staring spells, which would last for 5 seconds, and he is unresponsive.  No oral or manual automatisms.  No major injury.  He does not usually fall with his staring spells.  These currently happen 1-2 times a month.  He does not have postictal confusion.  He denies auras.        The patient had only 1 generalized tonic-clonic seizure, which happened on 2017 in the evening.  He had no warning.  He was sitting in a recliner watching TV and eating dessert.  He was witnessed by his wife that he was shaking all over.  He does not know how long it lasted; to his wife, it seemed like forever.  He had no tongue bite or urinary incontinence.  He woke up in the ambulance confused.  His wife told him he walked to the ambulance, but he did not recall that.  He does not know of any trigger, such as sleep deprivation, illness, missing medications or drinking.      RISK FACTORS FOR EPILEPSY:  His sister has grand mal seizures.  His daughter has staring spells.  She is 6 years old, and they have noted staring spells, but she has not been evaluated yet.          MEDICATIONS:     1.  Levetiracetam 1500 mg  t.i.d.   2.  Lamotrigine 200-200-300 mg per day.  He was on 800 mg per day, but he felt dizzy.        He has tried Dilantin for many years when he was younger, but still had staring spells.  He has not tried any other medications.        PAST MEDICAL HISTORY:  History of kidney oncocytoma, status post partial nephrectomy in 09/2016.      PREVIOUS TESTING FOR EPILEPSY:  MRI on 04/10/2017 showed a few punctate foci of signal loss, likely hemosiderin deposition in the cerebral hemispheres, most likely representing remote microhemorrhages that could be due to previous trauma or microangiopathy.  No recent significant hemorrhage.  An EEG on 04/10/2017 was a normal awake, drowsy and sleep.      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  He says he always had poor grades, has memory problems.  He graduated high school and had some vocational education at [employer].  He worked as a  until January of this year when he fell during one of his seizures.  He states he had mild shaking, but it was not as vigorous as his grand mal seizure on 05/23.  He does not drink or smoke.        REVIEW OF SYSTEMS:  Complete review of system was done and was positive for sleepiness, memory problems, mainly short-term memory loss, dizziness when he was on a higher dose of lamotrigine, now improved, decreased appetite, sleep problems.  He is taking melatonin.  He still may wake up at midnight.  He tries to take an extra melatonin to fall asleep.  He has some headaches and cough.        PHYSICAL EXAMINATION:   VITAL SIGNS:  Blood pressure 130/90, pulse 62, weight *** pounds 3.2 ounces, height 5 feet 10 inches.   GENERAL APPEARANCE:  Alert, awake, cooperative and pleasant, in no apparent distress.      NEUROLOGICAL EXAMINATION:   MENTAL STATUS:  He does not know the name of the clinic.  He is oriented to city and state.  He does not know the day of the week and the date.  He says it is Wednesday, and it is 05/29.  He  says it is difficult to catch up with dates when he does not work.     LANGUAGE/SPEECH:  No aphasia or dysarthria.   CRANIAL NERVES:  Pupils are round and reactive to light.  Extraocular movements are intact.  No nystagmus.  Facial sensation is intact to light touch.  Face is symmetric.  Hearing is intact to voice.  Shrug shoulder is normal.  Tongue is midline.   MOTOR:  Normal tone, bulk and strength 5/5 bilaterally with no drift.   SENSATION:  Intact to light touch bilaterally.   COORDINATION:  Normal finger-to-nose.   GAIT:  Steady.  He has trouble performing tandem gait.       IMAGING:  I reviewed the 3 hour video EEG at [facility] today, which shows generalized spike-and-wave activities at 3-4.5 Hz during sleep, consistent with generalized epilepsy.      ASSESSMENT:  A 47-year-old, right-handed male with a history of seizures since childhood with staring spells and rare generalized tonic-clonic seizures.  He is currently on lamotrigine and levetiracetam.  He has tried Dilantin in the past.  He has not tried any other medications.  His EEG shows generalized spike-and-wave discharges.  He has a sister with generalized tonic-clonic seizures, and his 6-year-old daughter seems to have staring spells, although she has not been evaluated yet.  EEG findings and family history are in favor for idiopathic generalized epilepsy.  I discussed starting him on Depakote.  The side effects were discussed.  He is open to starting a new medication.  We are going to adjust lamotrigine dose as he had the Depakote.  He had a brain MRI in April of this year; I asked him to bring the disk for me.        Seizure precautions and driving were discussed.  He is currently not driving and not working.  He had been working as a .      PLAN:   1.  Start Depakote  mg b.i.d. for 1 week and then increase to 750 mg b.i.d.   2.  Obtain Depakote and lamotrigine levels in 2 weeks.   3.  Return to clinic in 8 weeks.         BRENT  MD SIRENA

## 2017-05-30 NOTE — MR AVS SNAPSHOT
After Visit Summary   2017    Johnson Crystal    MRN: [id number]           Patient Information     Date Of Birth          1970        Visit Information        Provider Department      2017 9:30 AM [facility] EEG 3 [facility]        Today's Diagnoses     Seizure disorder (H)    -  1       Follow-ups after your visit        Your next 10 appointments already scheduled     Aug 08, 2017 12:30 PM CDT   New Patient Visit with Jl Greenwood, PhD LP   [facility] ([facility])    [address], [address]  [facility] [id number] [phone]            Aug 10, 2017 11:00 AM CDT   Return Visit with Leigh Ann Fong MD   [facility] ([facility])    [address], [address]  [facility] [address] [phone]              Who to contact     Please call your clinic at [phone] to:    Ask questions about your health    Make or cancel appointments    Discuss your medicines    Learn about your test results    Speak to your doctor   If you have compliments or concerns about an experience at your clinic, or if you wish to file a complaint, please contact [facility] Patient Relations at [phone] or email us at [email or web address]         Additional Information About Your Visit        MyChart Information     tenXer is an electronic gateway that provides easy, online access to your medical records. With tenXer, you can request a clinic appointment, read your test results, renew a prescription or communicate with your care team.     To sign up for [employer] visit the website at [email or web address]   You will be asked to enter the access code listed below, as well as some personal information. Please follow the directions to create your username and password.     Your access code is: [email or web address]  Expires: 2017 11:00 AM     Your access code will  in 90 days. If you  need help or a new code, please contact your [facility] or call [phone] for assistance.        Care EveryWhere ID     This is your Care EveryWhere ID. This could be used by other organizations to access your [facility] medical records  [id number]         Blood Pressure from Last 3 Encounters:   No data found for BP    Weight from Last 3 Encounters:   No data found for Wt              Today, you had the following     No orders found for display         Today's Medication Changes          These changes are accurate as of: 5/30/17 11:59 PM.  If you have any questions, ask your nurse or doctor.               Start taking these medicines.        Dose/Directions    * divalproex 500 MG 24 hr tablet   Commonly known as:  DEPAKOTE ER   Used for:  Seizure disorder (H)   Started by:  Leigh Ann Fong MD        Dose:  500 mg   Take 1 tablet (500 mg) by mouth 2 times daily   Quantity:  60 tablet   Refills:  3       * divalproex 250 MG 24 hr tablet   Commonly known as:  DEPAKOTE ER   Used for:  Seizure disorder (H)   Started by:  Leigh Ann Fong MD        Take 1 tab twice a day (in addition to 500 mg bid) one week after starting Depakote 500 mg   Quantity:  60 tablet   Refills:  3       * Notice:  This list has 2 medication(s) that are the same as other medications prescribed for you. Read the directions carefully, and ask your doctor or other care provider to review them with you.      These medicines have changed or have updated prescriptions.        Dose/Directions    * lamoTRIgine 200 MG tablet   Commonly known as:  LaMICtal   This may have changed:  when to take this   Used for:  Seizure disorder (H)   Changed by:  Leigh Ann Fong MD        Dose:  200 mg   Take 1 tablet (200 mg) by mouth daily (with breakfast)   Quantity:  30 tablet   Refills:  3       * lamoTRIgine 150 MG tablet   Commonly known as:  LaMICtal   This may have changed:  when to take this   Used for:   Seizure disorder (H)   Changed by:  Leigh Ann Fong MD        Dose:  300 mg   Take 2 tablets (300 mg) by mouth every evening   Quantity:  60 tablet   Refills:  3       * Notice:  This list has 2 medication(s) that are the same as other medications prescribed for you. Read the directions carefully, and ask your doctor or other care provider to review them with you.         Where to get your medicines      These medications were sent to [address]  [address] [facility] [address]     Phone:  [phone]     divalproex 250 MG 24 hr tablet    divalproex 500 MG 24 hr tablet    lamoTRIgine 150 MG tablet    lamoTRIgine 200 MG tablet                Primary Care Provider Office Phone # Fax #    Earl Covarrubias [phone]       [address] [address]        Thank you!     Thank you for choosing [facility] EPILEPS[facility]  for your care. Our goal is always to provide you with excellent care. Hearing back from our patients is one way we can continue to improve our services. Please take a few minutes to complete the written survey that you may receive in the mail after your visit with us. Thank you!             Your Updated Medication List - Protect others around you: Learn how to safely use, store and throw away your medicines at www.disposemymeds.org.          This list is accurate as of: 5/30/17 11:59 PM.  Always use your most recent med list.                   Brand Name Dispense Instructions for use    * divalproex 500 MG 24 hr tablet    DEPAKOTE ER    60 tablet    Take 1 tablet (500 mg) by mouth 2 times daily       * divalproex 250 MG 24 hr tablet    DEPAKOTE ER    60 tablet    Take 1 tab twice a day (in addition to 500 mg bid) one week after starting Depakote 500 mg       KEPPRA PO      Take 1,500 mg by mouth 3 times daily       * lamoTRIgine 200 MG tablet    LaMICtal    30 tablet    Take 1 tablet (200 mg) by mouth  daily (with breakfast)       * lamoTRIgine 150 MG tablet    LaMICtal    60 tablet    Take 2 tablets (300 mg) by mouth every evening       MELATONIN PO      Take 5-10 mg by mouth       * Notice:  This list has 4 medication(s) that are the same as other medications prescribed for you. Read the directions carefully, and ask your doctor or other care provider to review them with you.

## 2017-05-30 NOTE — PATIENT INSTRUCTIONS
- Start Depakote  mg twice daily for 1 week, then increase to 750 mg twice daily (500 mg + 250 mg tabs).     - Decrease lamotrigine to 200 mg am and 300 mg pm (before 5 pm)      5/30/2017      Times of Days  morning  evening       Medication  Depakote Tablet Size Number of Tablets/Capsules Total Daily Dosage    Week 1   500  250 1  0  1  0    1000 MG     Week 2 onwards 500  250 1  1  1  1     1500 mg     Carry this with you at all times.  CONTINUE TAKING YOUR OTHER MEDICATIONS AS PREVIOUSLY DIRECTED.      * * *Do not store medications in the bathroom.  Keep medications away from children!* * *       Have blood drawn locally in 2 weeks time, results to be sent to to Dr.Shams Lawson, Fax [phone]

## 2017-05-30 NOTE — LETTER
2017      RE: Johnson Crystal  [address]  [place] [address]       [facility] Epilepsy [facility] History and Physical       Patient:  Johnson Crystal  :  1970   Age:  47 year old   Today's Office Visit:  2017    Referring Provider:    Florence Khan MD  [facility]  [address] [place] [id number]      INTRODUCTION:  The patient is a 47-year-old, right-handed male who was referred by Dr. Florence Khan for evaluation of seizures.      HISTORY OF PRESENT ILLNESS:  The patient started having seizures in his childhood.  He does not remember the exact age.  [place], and he fell off a barn loft at least 3 times and hit his head on the concrete.  He does not think he lost consciousness, and he did not seek any medical attention.  He was on Dilantin for many years, but he still had occasional staring spells.  Staring spells would last 5 seconds, he is unresponsive.  No oral or manual automatisms.  He does not have postictal confusion.  He denies auras.  These currently happen 1-2 times a month.      The patient had only 1 generalized tonic-clonic seizure, which happened on 2017 in the evening.  He had no warning.  He was sitting in a recliner watching TV and eating dessert.  He was witnessed by his wife that he was shaking all over.  He does not know how long it lasted; to his wife, it seemed like forever.  He had no tongue bite or urinary incontinence.  He woke up in the ambulance confused.  His wife told him he walked to the ambulance, but he did not recall that.  He does not know of any trigger, such as sleep deprivation, illness, missing medications or drinking.      RISK FACTORS FOR EPILEPSY:  His sister has grand mal seizures.  His daughter has staring spells.  She is 6 years old, and they have noted staring spells, but she has not been evaluated yet.          MEDICATIONS:     1.  Levetiracetam 1500 mg t.i.d.   2.  Lamotrigine  "200-200-300 mg per day.  He was on 800 mg per day, but he felt dizzy.        He has tried Dilantin for many years when he was younger, but still had staring spells.  He has not tried any other medications.        PAST MEDICAL HISTORY:  History of kidney oncocytoma, status post partial nephrectomy in 09/2016.      PREVIOUS TESTING FOR EPILEPSY:  MRI on 04/10/2017 showed a few punctate foci of signal loss, likely hemosiderin deposition in the cerebral hemispheres, most likely representing remote microhemorrhages that could be due to previous trauma or microangiopathy.  No recent significant hemorrhage.  An EEG on 04/10/2017 was a normal awake, drowsy and sleep.      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  He says he always had poor grades, has memory problems.  He graduated high school and had some vocational education at [employer].  He worked as a  until January of this year when he fell during one of his seizures.  He states he had mild shaking, but it was not as vigorous as his grand mal seizure on 05/23.  He does not drink or smoke.        REVIEW OF SYSTEMS:  Complete review of system was done and was positive for sleepiness, memory problems, mainly short-term memory loss, dizziness when he was on a higher dose of lamotrigine, now improved, decreased appetite, sleep problems.  He is taking melatonin.  He still may wake up at midnight.  He tries to take an extra melatonin to fall asleep.  He has some headaches and cough.        PHYSICAL EXAMINATION:   /90  Pulse 62  Ht 1.778 m (5' 10\")  Wt 85.4 kg (188 lb 3.2 oz)  BMI 27 kg/m2    GENERAL APPEARANCE:  Alert, awake, cooperative and pleasant, in no apparent distress.      NEUROLOGICAL EXAMINATION:   MENTAL STATUS:  He does not know the name of the clinic.  He is oriented to city and state.  He does not know the day of the week and the date.  He says it is Wednesday, and it is 05/29.  He says it is difficult to catch up with " dates when he does not work.     LANGUAGE/SPEECH:  No aphasia or dysarthria.   CRANIAL NERVES:  Pupils are round and reactive to light.  Extraocular movements are intact.  No nystagmus.  Facial sensation is intact to light touch.  Face is symmetric.  Hearing is intact to voice.  Shrug shoulder is normal.  Tongue is midline.   MOTOR:  Normal tone, bulk and strength 5/5 bilaterally with no drift.   SENSATION:  Intact to light touch bilaterally.   COORDINATION:  Normal finger-to-nose.   GAIT:  Steady.  He has trouble performing tandem gait.       IMAGING:  I reviewed the 3 hour video EEG at [facility] today, which shows generalized spike-and-wave activities at 3-4.5 Hz during sleep, consistent with generalized epilepsy.      ASSESSMENT:  A 47-year-old, right-handed male with a history of seizures since childhood with staring spells and rare generalized tonic-clonic seizures.  He is currently on lamotrigine and levetiracetam.  He has tried Dilantin in the past.  He has not tried any other medications.  His EEG shows generalized spike-and-wave discharges.  He has a sister with generalized tonic-clonic seizures, and his 6-year-old daughter seems to have staring spells, although she has not been evaluated yet.  EEG findings and family history are in favor for idiopathic generalized epilepsy.  I discussed starting him on Depakote.  The side effects were discussed.  He is open to starting a new medication.  We are going to adjust lamotrigine dose as he had the Depakote.  He had a brain MRI in April of this year; I asked him to bring the disk for me.        Seizure precautions and driving were discussed.  He is currently not driving and not working.  He had been working as a .      PLAN:   1.  Start Depakote  mg b.i.d. for 1 week and then increase to 750 mg b.i.d.   2.  Obtain Depakote and lamotrigine levels in 2 weeks.   3.  Return to clinic in 8 weeks.             BRENT PACK MD             D: 05/31/2017  10:48   T: 2017 12:51   MT: mm      Name:     LUCIEN KING   MRN:      [id number]        Account:      [id number]   :      1970           Service Date: 2017      Document: [id number]        Leigh Ann Fong MD

## 2017-05-30 NOTE — MR AVS SNAPSHOT
After Visit Summary   5/30/2017    Johnson Crystal    MRN: [id number]           Patient Information     Date Of Birth          1970        Visit Information        Provider Department      5/30/2017 1:00 PM Leigh Ann Fong MD [facility]        Today's Diagnoses     Seizure disorder (H)          Care Instructions    - Start Depakote  mg twice daily for 1 week, then increase to 750 mg twice daily (500 mg + 250 mg tabs).     - Decrease lamotrigine to 200 mg am and 300 mg pm (before 5 pm)      5/30/2017      Times of Days  morning  evening       Medication  Depakote Tablet Size Number of Tablets/Capsules Total Daily Dosage    Week 1   500  250 1  0  1  0    1000 MG     Week 2 onwards 500  250 1  1  1  1     1500 mg     Carry this with you at all times.  CONTINUE TAKING YOUR OTHER MEDICATIONS AS PREVIOUSLY DIRECTED.      * * *Do not store medications in the bathroom.  Keep medications away from children!* * *       Have blood drawn locally in 2 weeks time, results to be sent to to Dr.Shams Lawson, Fax [phone]            Follow-ups after your visit        Additional Services     Neuropsych Testing Referral - [facility]       Please schedule a visit to complete Neuropsychological Testing at [facility].  Seizure disorder, generalized, memory problems.                  Follow-up notes from your care team     Return in about 8 weeks (around 7/25/2017) for Routine Visit.      Your next 10 appointments already scheduled     Aug 08, 2017 12:30 PM CDT   New Patient Visit with Jl Greenwood, PhD LP   [facility] ([facility])    [address], [address]  [facility] [id number] [phone]            Aug 10, 2017 11:00 AM CDT   Return Visit with Leigh Ann Fong MD   [facility] ([facility])    [address], [address]  [facility] [id number] [phone]              Future tests that were ordered for you today      "Open Future Orders        Priority Expected Expires Ordered    Valproic acid Routine 2017    Valproic acid free Routine 2017    Lamotrigine Level Routine 2017    Keppra (Levetiracetam) Level Routine 2017            Who to contact     Please call your clinic at [phone] to:    Ask questions about your health    Make or cancel appointments    Discuss your medicines    Learn about your test results    Speak to your doctor   If you have compliments or concerns about an experience at your clinic, or if you wish to file a complaint, please contact [facility] Patient Relations at [phone] or email us at [email or web address]         Additional Information About Your Visit        Carousell Information     Carousell is an electronic gateway that provides easy, online access to your medical records. With Carousell, you can request a clinic appointment, read your test results, renew a prescription or communicate with your care team.     To sign up for Carousell visit the website at [email or web address]   You will be asked to enter the access code listed below, as well as some personal information. Please follow the directions to create your username and password.     Your access code is: [email or web address]  Expires: 2017 11:00 AM     Your access code will  in 90 days. If you need help or a new code, please contact your [facility] or call [phone] for assistance.        Care EveryWhere ID     This is your Care EveryWhere ID. This could be used by other organizations to access your [place] medical records  [id number]        Your Vitals Were     Pulse Height BMI (Body Mass Index)             62 5' 10\" (177.8 cm) 27 kg/m2          Blood Pressure from Last 3 Encounters:   17 130/90   17 119/78    Weight from Last 3 Encounters: "   05/30/17 188 lb 3.2 oz (85.4 kg)   05/15/17 192 lb 9.6 oz (87.4 kg)              We Performed the Following     Neuropsych Testing Referral - [facility]          Today's Medication Changes          These changes are accurate as of: 5/30/17  2:54 PM.  If you have any questions, ask your nurse or doctor.               Start taking these medicines.        Dose/Directions    * divalproex 500 MG 24 hr tablet   Commonly known as:  DEPAKOTE ER   Used for:  Seizure disorder (H)   Started by:  Leigh Ann Fong MD        Dose:  500 mg   Take 1 tablet (500 mg) by mouth 2 times daily   Quantity:  60 tablet   Refills:  3       * divalproex 250 MG 24 hr tablet   Commonly known as:  DEPAKOTE ER   Used for:  Seizure disorder (H)   Started by:  Leigh Ann Fong MD        Take 1 tab twice a day (in addition to 500 mg bid) one week after starting Depakote 500 mg   Quantity:  60 tablet   Refills:  3       * Notice:  This list has 2 medication(s) that are the same as other medications prescribed for you. Read the directions carefully, and ask your doctor or other care provider to review them with you.      These medicines have changed or have updated prescriptions.        Dose/Directions    * lamoTRIgine 200 MG tablet   Commonly known as:  LaMICtal   This may have changed:  when to take this   Used for:  Seizure disorder (H)   Changed by:  Leigh Ann Fong MD        Dose:  200 mg   Take 1 tablet (200 mg) by mouth daily (with breakfast)   Quantity:  30 tablet   Refills:  3       * lamoTRIgine 150 MG tablet   Commonly known as:  LaMICtal   This may have changed:  when to take this   Used for:  Seizure disorder (H)   Changed by:  Leigh Ann Fong MD        Dose:  300 mg   Take 2 tablets (300 mg) by mouth every evening   Quantity:  60 tablet   Refills:  3       * Notice:  This list has 2 medication(s) that are the same as other medications prescribed for you. Read the directions carefully, and ask your doctor or other  care provider to review them with you.         Where to get your medicines      These medications were sent to [facility] [address] [address]     Phone:  [phone]     divalproex 250 MG 24 hr tablet    divalproex 500 MG 24 hr tablet    lamoTRIgine 150 MG tablet    lamoTRIgine 200 MG tablet                Primary Care Provider Office Phone # Fax #    Earl Covarrubias [phone]       [address] [address]        Thank you!     Thank you for choosing [facility]LE[facility]  for your care. Our goal is always to provide you with excellent care. Hearing back from our patients is one way we can continue to improve our services. Please take a few minutes to complete the written survey that you may receive in the mail after your visit with us. Thank you!             Your Updated Medication List - Protect others around you: Learn how to safely use, store and throw away your medicines at www.disposemymeds.org.          This list is accurate as of: 5/30/17  2:54 PM.  Always use your most recent med list.                   Brand Name Dispense Instructions for use    * divalproex 500 MG 24 hr tablet    DEPAKOTE ER    60 tablet    Take 1 tablet (500 mg) by mouth 2 times daily       * divalproex 250 MG 24 hr tablet    DEPAKOTE ER    60 tablet    Take 1 tab twice a day (in addition to 500 mg bid) one week after starting Depakote 500 mg       KEPPRA PO      Take 1,500 mg by mouth 3 times daily       * lamoTRIgine 200 MG tablet    LaMICtal    30 tablet    Take 1 tablet (200 mg) by mouth daily (with breakfast)       * lamoTRIgine 150 MG tablet    LaMICtal    60 tablet    Take 2 tablets (300 mg) by mouth every evening       MELATONIN PO      Take 5-10 mg by mouth       * Notice:  This list has 4 medication(s) that are the same as other medications prescribed for you. Read the directions carefully, and ask your doctor or  other care provider to review them with you.

## 2017-05-31 NOTE — PROGRESS NOTES
[facility] Epilepsy Care History and Physical       Patient:  Johnson Crystal  :  1970   Age:  47 year old   Today's Office Visit:  2017    Referring Provider:    Florence Khan MD  [facility]  [address] [place] [address]      INTRODUCTION:  The patient is a 47-year-old, right-handed male who was referred by Dr. Florence Khan for evaluation of seizures.      HISTORY OF PRESENT ILLNESS:  The patient started having seizures in his childhood.  He does not remember the exact age.  [place] and he fell off a barn loft at least 3 times and hit his head on the concrete.  He does not think he lost consciousness, and he did not seek any medical attention.  He was on Dilantin for many years, but he still had occasional staring spells.  Staring spells would last 5 seconds, he is unresponsive.  No oral or manual automatisms.  He does not have postictal confusion.  He denies auras.  These currently happen 1-2 times a month.      The patient had only 1 generalized tonic-clonic seizure, which happened on 2017 in the evening.  He had no warning.  He was sitting in a recliner watching TV and eating dessert.  He was witnessed by his wife that he was shaking all over.  He does not know how long it lasted; to his wife, it seemed like forever.  He had no tongue bite or urinary incontinence.  He woke up in the ambulance confused.  His wife told him he walked to the ambulance, but he did not recall that.  He does not know of any trigger, such as sleep deprivation, illness, missing medications or drinking.      RISK FACTORS FOR EPILEPSY:  His sister has grand mal seizures.  His daughter has staring spells.  She is 6 years old, and they have noted staring spells, but she has not been evaluated yet.          MEDICATIONS:     1.  Levetiracetam 1500 mg t.i.d.   2.  Lamotrigine 200-200-300 mg per day.  He was on 800 mg per day, but he felt dizzy.        He has tried  "Dilantin for many years when he was younger, but still had staring spells.  He has not tried any other medications.        PAST MEDICAL HISTORY:  History of kidney oncocytoma, status post partial nephrectomy in 09/2016.      PREVIOUS TESTING FOR EPILEPSY:  MRI on 04/10/2017 showed a few punctate foci of signal loss, likely hemosiderin deposition in the cerebral hemispheres, most likely representing remote microhemorrhages that could be due to previous trauma or microangiopathy.  No recent significant hemorrhage.  An EEG on 04/10/2017 was a normal awake, drowsy and sleep.      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  He says he always had poor grades, has memory problems.  He graduated high school and had some vocational education at [employer].  He worked as a  until January of this year when he fell during one of his seizures.  He states he had mild shaking, but it was not as vigorous as his grand mal seizure on 05/23.  He does not drink or smoke.        REVIEW OF SYSTEMS:  Complete review of system was done and was positive for sleepiness, memory problems, mainly short-term memory loss, dizziness when he was on a higher dose of lamotrigine, now improved, decreased appetite, sleep problems.  He is taking melatonin.  He still may wake up at midnight.  He tries to take an extra melatonin to fall asleep.  He has some headaches and cough.        PHYSICAL EXAMINATION:   /90  Pulse 62  Ht 1.778 m (5' 10\")  Wt 85.4 kg (188 lb 3.2 oz)  BMI 27 kg/m2    GENERAL APPEARANCE:  Alert, awake, cooperative and pleasant, in no apparent distress.      NEUROLOGICAL EXAMINATION:   MENTAL STATUS:  He does not know the name of the clinic.  He is oriented to city and state.  He does not know the day of the week and the date.  He says it is Wednesday, and it is 05/29.  He says it is difficult to catch up with dates when he does not work.     LANGUAGE/SPEECH:  No aphasia or dysarthria.   CRANIAL " NERVES:  Pupils are round and reactive to light.  Extraocular movements are intact.  No nystagmus.  Facial sensation is intact to light touch.  Face is symmetric.  Hearing is intact to voice.  Shrug shoulder is normal.  Tongue is midline.   MOTOR:  Normal tone, bulk and strength 5/5 bilaterally with no drift.   SENSATION:  Intact to light touch bilaterally.   COORDINATION:  Normal finger-to-nose.   GAIT:  Steady.  He has trouble performing tandem gait.       IMAGING:  I reviewed the 3 hour video EEG at [facility] today, which shows generalized spike-and-wave activities at 3-4.5 Hz during sleep, consistent with generalized epilepsy.      ASSESSMENT:  A 47-year-old, right-handed male with a history of seizures since childhood with staring spells and rare generalized tonic-clonic seizures.  He is currently on lamotrigine and levetiracetam.  He has tried Dilantin in the past.  He has not tried any other medications.  His EEG shows generalized spike-and-wave discharges.  He has a sister with generalized tonic-clonic seizures, and his 6-year-old daughter seems to have staring spells, although she has not been evaluated yet.  EEG findings and family history are in favor for idiopathic generalized epilepsy.  I discussed starting him on Depakote.  The side effects were discussed.  He is open to starting a new medication.  We are going to adjust lamotrigine dose as he had the Depakote.  He had a brain MRI in April of this year; I asked him to bring the disk for me.        Seizure precautions and driving were discussed.  He is currently not driving and not working.  He had been working as a .      PLAN:   1.  Start Depakote  mg b.i.d. for 1 week and then increase to 750 mg b.i.d.   2.  Obtain Depakote and lamotrigine levels in 2 weeks.   3.  Return to clinic in 8 weeks.             BRENT PACK MD             D: 05/31/2017 10:48   T: 05/31/2017 12:51   MT: roosevelt      Name:     LUCIEN KING   MRN:       [id number]        Account:      [id number]   :      1970           Service Date: 2017      Document: [id number]

## 2017-06-14 NOTE — PROCEDURES
"EEG #:  [id number]      DATE OF STUDY:  05/30/2017      SOURCE FILE DURATION:  3 hours 3 minutes 27 seconds     CLINICAL SUMMERY:  Video EEG is being performed on Johnson Crystal, a 47 year old male for evaluation of seizures.  He is taking lamotrigine and levetiracetam.     TECHNICAL SUMMARY: This continuous video- EEG monitoring procedure was performed with 23 scalp electrodes in 10-20 electrode system placements, and additional scalp, precordial and other surface electrodes used for electrical referencing and artifact detection.  Video monitoring was utilized and periodically reviewed by EEG technologists and the physician for electroclinical correlations.     INTERICTAL ACTIVITY:  During maximal wakefulness, there was 9 Hz alpha activity over the posterior head regions, which was symmetric and attenuated by eye opening.  Drowsiness was manifested as dropout of posterior dominant rhythm and diffuse theta activity and horizontal eye movements.  Stage 2 sleep was manifested as vertex waves, symmetric sleep spindles and K complexes.  Generalized 3 Hz spike-and-wave discharges were seen during sleep.  They often occurred as an isolated discharge or brief repetitive discharges up to 1 second.      ACTIVATING MANEUVERS:  Photic stimulation induced a photic driving response in some of the flash frequencies.  Hyperventilation induced bursts of generalized delta slowing as well as generalized spike-and-wave discharges.      CLINICAL/ICTAL EVENTS:  No electrographic or clinical seizures were recorded.  The patient had an event at 11:08.  The patient reported, \"I don't know what you're doing with this monitoring thing, but feels like it's moving around in different parts of my body\", reported \"little bit of heat and throbbing throughout body, also palpitation.\"  He stated, \"I feel a throbbing in my fingers with every heartbeat and feel like electricity shooting off into the fingers and wrists.\"  These symptoms lasted for " approximately 4 minutes.  The EEG showed normal PDR and normal waking background activities.  No ictal activity was seen during this event.      IMPRESSION:  Abnormal video EEG due to the presence of generalized epileptiform discharges consistent with generalized epilepsy.  No electrographic or clinical seizures were recorded.  The patient had an event with symptoms of feeling heat and throbbing in his body with no abnormal EEG correlate.  Clinical correlation is suggested.         BRENT PACK MD             D: 2017 11:32   T: 2017 14:41   MT: roosevelt      Name:     LUCIEN KING   MRN:      [id number]        Account:        [id number]   :      1970           Procedure Date: 2017      Document: [id number]

## 2017-06-15 ENCOUNTER — TELEPHONE (OUTPATIENT)
Dept: NEUROLOGY | Facility: CLINIC | Age: 47
End: 2017-06-15

## 2017-06-15 NOTE — TELEPHONE ENCOUNTER
[facility] Prior Authorization Team   Phone: [phone]  Fax: [phone]      PA Initiation    Medication: divalproex (DEPAKOTE ER) 250 MG 24 hr tablet   Insurance Company: MED[employer] - Phone [phone] Fax [phone]  Pharmacy Filling the Rx:    Filling Pharmacy Phone: [phone]  Filling Pharmacy Fax:    Start Date: 6/15/2017

## 2017-06-15 NOTE — TELEPHONE ENCOUNTER
Prior Authorization Retail Medication Request  Medication/Dose: divalproex (DEPAKOTE ER) 250 MG 24 hr tablet, Take 1 tab twice a day (in addition to 500 mg bid) one week after starting Depakote 500 mg  Diagnosis and ICD code: Seizure disorder (H) [G40.909]   New/Renewal/Insurance Change PA: New  Previously Tried and Failed Therapies: See Chart    Insurance ID (if provided):   Insurance Phone (if provided):     Any additional info from fax request:     If you received a fax notification from an outside Pharmacy:  Pharmacy Name: [facility]  Pharmacy #:[phone]  Pharmacy Fax:[phone]

## 2017-06-16 NOTE — TELEPHONE ENCOUNTER
Prior Authorization Not Needed per Insurance    Medication: divalproex (DEPAKOTE ER) 250 MG 24 hr tablet   Insurance Company: MEDICA - Phone [phone] Fax [phone]  Expected CoPay: $0.00    Pharmacy Filling the Rx: [facility] [address]  Pharmacy Notified: Yes  Patient Notified: No    Patient picked up RX on 06/02/2017 without a PA

## 2017-07-10 ENCOUNTER — TRANSFERRED RECORDS (OUTPATIENT)
Dept: HEALTH INFORMATION MANAGEMENT | Facility: CLINIC | Age: 47
End: 2017-07-10

## 2017-07-11 DIAGNOSIS — G40.909 SEIZURE DISORDER (H): ICD-10-CM

## 2017-07-11 LAB
KEPPRA (LEVETIRACETAM) LEVEL: 56.1 UG/ML (ref 6–46)
LAMOTRIGINE SERPL-MCNC: 15.9 UG/ML (ref 3–15)
VALPROIC ACID FREE: 8.7 UG/ML (ref 6–20)
VALPROIC ACID TOTAL: 74.9 UG/ML (ref 50–100)

## 2017-08-08 ENCOUNTER — OFFICE VISIT (OUTPATIENT)
Dept: NEUROLOGY | Facility: CLINIC | Age: 47
End: 2017-08-08

## 2017-08-08 DIAGNOSIS — F41.9 ANXIETY: ICD-10-CM

## 2017-08-08 DIAGNOSIS — G40.319 GENERALIZED NONCONVULSIVE EPILEPSY WITH INTRACTABLE EPILEPSY (H): Primary | ICD-10-CM

## 2017-08-08 DIAGNOSIS — F33.0 MAJOR DEPRESSIVE DISORDER, RECURRENT EPISODE, MILD (H): ICD-10-CM

## 2017-08-08 NOTE — MR AVS SNAPSHOT
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)    -  1    Major depressive disorder, recurrent episode, mild (H)        Anxiety           Follow-ups after your visit        Your next 10 appointments already scheduled     Dec 11, 2017  3:30 PM CST   Return Visit with Leigh Ann Fong MD   [facility] ([email or web address])    [address], [address]  [facility] [id number] [phone]              Who to contact     Please call your clinic at [phone] to:    Ask questions about your health    Make or cancel appointments    Discuss your medicines    Learn about your test results    Speak to your doctor   If you have compliments or concerns about an experience at your clinic, or if you wish to file a complaint, please contact [facility] Patient Relations at [phone] or email us at [email or web address]         Additional Information About Your Visit        MyChart Information     [employer] is an electronic gateway that provides easy, online access to your medical records. With Greasebook, you can request a clinic appointment, read your test results, renew a prescription or communicate with your care team.     To sign up for [employer] visit the website at [email or web address]   You will be asked to enter the access code listed below, as well as some personal information. Please follow the directions to create your username and password.     Your access code is: [email or web address]  Expires: 2017 11:00 AM     Your access code will  in 90 days. If you need help or a new code, please contact your [facility]  Clinic or call [phone] for assistance.        Care EveryWhere ID     This is your Care EveryWhere ID. This could be used by other organizations to access your [place] medical records  [id number]         Blood Pressure from Last 3 Encounters:   08/10/17 131/71   05/30/17 130/90   05/18/17 119/78    Weight from Last 3 Encounters:   08/10/17 89.7 kg (197 lb 12.8 oz)   05/30/17 85.4 kg (188 lb 3.2 oz)   05/15/17 87.4 kg (192 lb 9.6 oz)              We Performed the Following     [id number] TESTING, PER HR/PSYCHOLOGIST     NEUROPSYCH TESTING BY [facility]        Primary Care Provider Office Phone # Fax #    Earl Covarrubias [phone]       [address] [address]        Equal Access to Services     CECELIA BOWEN : Hadii aad ku hadasho Sojackelin, waaxda luqadaha, qaybta kaalmada adeloydyamaureen, gillian monroe . So [facility] [phone].    ATENCIÓN: Si habla español, tiene a sanon disposición servicios gratuitos de asistencia lingüística. Marshalame al [phone].    We comply with applicable federal civil rights laws and [place] laws. We do not discriminate on the basis of race, color, national origin, age, disability sex, sexual orientation or gender identity.            Thank you!     Thank you for choosing [facility] EPILEPSY [facility]  for your care. Our goal is always to provide you with excellent care. Hearing back from our patients is one way we can continue to improve our services. Please take a few minutes to complete the written survey that you may receive in the mail after your visit with us. Thank you!             Your Updated Medication List - Protect others around you: Learn how to safely use, store and throw away your medicines at www.disposemymeds.org.          This list is accurate as of: 8/8/17 11:59 PM.  Always use your most recent med list.                   Brand Name Dispense Instructions for use Diagnosis    * divalproex 500 MG 24 hr  tablet    DEPAKOTE ER    60 tablet    Take 1 tablet (500 mg) by mouth 2 times daily    Seizure disorder (H)       * divalproex 250 MG 24 hr tablet    DEPAKOTE ER    60 tablet    Take 1 tab twice a day (in addition to 500 mg bid) one week after starting Depakote 500 mg    Seizure disorder (H)       KEPPRA PO      Take 1,500 mg by mouth 3 times daily        lamoTRIgine 150 MG tablet    LaMICtal    60 tablet    Take 2 tablets (300 mg) by mouth every evening    Seizure disorder (H)       MELATONIN PO      Take 5-10 mg by mouth        * Notice:  This list has 2 medication(s) that are the same as other medications prescribed for you. Read the directions carefully, and ask your doctor or other care provider to review them with you.

## 2017-08-08 NOTE — LETTER
2017     RE: Johnson Crystal  : 1970   MRN: [id number]      Dear Colleague,    Thank you for referring your patient, Johnson Crystal, to the [facility] at [facility] Please see a copy of my visit note below.    Name: Johnson Crystal  MR#: [id number]  YOB: 1970  Date of Exam: 2017      Neuropsychology Laboratory  [facility] [address], [address]  [place] [address]  [phone]    NEUROPSYCHOLOGICAL EVALUATION    IDENTIFYING INFORMATION  Johnson Crystal is a 47 year old, right handed,  (on long-term disability), with 12+ years of formal education. He was unaccompanied to the evaluation.     BACKGROUND INFORMATION / INTERVIEW FINDINGS    Records indicate that Mr. Crystal began suffering from seizures in childhood. He has had one generalized tonic-clonic seizure (on May 23, 2017), and also has staring spells that have occurred more regularly. Please see Dr. Leigh Ann Fong s note from May 31, 2017 for more detailed background information and history of his seizures. MRI of his brain at the [facility] on April 10, 2017 documented  1. No acute abnormality. 2. A few punctate foci of signal loss, likely hemosiderin deposition, identified in the cerebellar hemispheres on the gradient echo sequence. These most likely represent remote micro hemorrhages, and could be due to previous trauma or microangiopathic. No evidence of recent significant hemorrhage.  EEG monitoring on May 30, 2017 documented generalized discharges, but no seizures were noted. His other medical history includes renal oncocytoma status post partial nephrectomy in . He has expressed concerns about his memory. The current evaluation was requested by Dr. Fong, in this context.    On interview, Mr. Crystal confirmed the above history. He reported that he began suffering from seizures at  approximately age 9 to 11, after he fell out of a hayloft in a barn. He indicated that he did not lose consciousness after this injury, but maybe had a headache afterwards. He denied a change in his cognitive functioning after this injury. He stated that he had  daydreaming  type seizures after this injury, and these same types of seizures are still occurring. He reported that the seizures now occur less often than they used to. He reported that he now has a daydreaming type seizure every three months or so. He indicated that the last of these seizures occurred in February. He also noted that he recently has had brief lapses in attention. He reported that these lapses may last anywhere between 1 to 4 seconds. He reported that these events are triggered by intense thinking and bright lights. He indicated that he may have had one of these events on the drive to the appointment.    Regarding cognition, Mr. Crystal stated that he has troubles with his memory. He indicated that these issues have been present for the last couple of years, and seem to be getting worse. He reported that he misplaces items, and may forget other day-to-day information. He denied having identified a trigger for onset of these symptoms. He also noted that his seizure medicines make him more emotional. He otherwise denied having identified cognitive changes or difficulties. Regarding mood, he reported feeling pretty laid-back. He denied irritability. He denied prior mental health diagnoses or treatments. He denied suicidal ideation.    With respect to other medical background, Mr. Crystal denied prior known TBI or stroke. Per records, his current medications include divalproex, lamotrigine, levetiracetam, and melatonin. He denied alcohol, tobacco, or illicit drug use. He denied past treatment or abuse for alcohol or drugs.    Mr. Crystal lives at home with his family. He manages his own basic daily activities, his own medications, and his own  finances. He reported that his wife and children are primarily responsible for preparing their meals. He does not drive. By way of background, he and his wife have been  since 1997. They have four children, ages 16, 15, 13, and 6. Three of his children are adopted. He noted some stress with one of his children. He reported that he completed the first and second grades in public school, but was otherwise homeschooled. He reported that he may have been held back one year, but was not certain. He indicated that he received poor to average grades in school. 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 He has worked as a  for 20 years. He is currently on long-term disability since his generalized seizure in May.    BEHAVIORAL OBSERVATIONS  Mr. Crystal was polite and cooperative during the exam. He worked slowly. His speech was notable for mild to moderate difficulties with word finding, mild dysfluency, mild dysarticulation, and occasional paraphasias. Prosody was normal. Comprehension was mildly impaired, as he was noted to need simplification and repetition of task instructions. His thought processes were slowed and tangential. His mood was mildly anxious with congruent affect. He was noted to become tearful on one measure. His effort was adequate, as he was noted to be hesitant to guess on items that he did not know. The current results are felt to be a broadly accurate representation of his cognitive functioning.    RESULTS OF EXAM  His performances on measures of neuropsychological functioning were as follows:      He misstated the name of the city, but was otherwise oriented to place. He was fully oriented to time and various aspects of personal information. Performance on a measure of single word reading was low average. Auditory attention for digits was impaired. Mental calculations were impaired. Learning of words in a list format was  borderline impaired. Short delayed recall of list words was low average. 30 minute delayed recall of list words was borderline impaired. Delayed recognition of list words was borderline impaired. Learning of story information was low average. Delayed recall of this information was average. Delayed recognition of story information was borderline impaired. Learning and delayed recognition of faces were both performed in the average range. His drawing of a complicated geometric figure was impaired, and was notable for a slow and piecemeal approach with inattention to the figure s details. Short delayed recall of the figure was borderline impaired. 30 minute delayed recall of the figure was borderline impaired. Delayed recognition of the figure s elements was average. Visuoperceptual matching of faces was performed in the borderline impaired range. Visual problem solving with blocks was borderline impaired. Comprehension of phrases and short stories was borderline impaired. Verbal associative fluency was borderline impaired. Semantic verbal fluency was borderline impaired. Naming to confrontation was impaired. Verbal abstract reasoning was low average. Fund of knowledge was average. Speeded visual sequencing under focused attention was impaired. A similar measure with a divided attention component was impaired, and discontinued at the test s time limit with one error. Speeded word reading was low average. Speeded color naming was low average. Speeded inhibition of an overlearned response was low average. Speeded visuomotor coding was average. Speeded fine motor dexterity was impaired, bilaterally. He had four peg drops with the dominant, right hand, and two peg drops with the left hand.    He endorsed items consistent with mild symptoms of depression, and mild symptoms of anxiety on self-report measures.    IMPRESSIONS  Mr. Crystal demonstrated a pattern of weaknesses and deficits that is consistent with relative  dysfunction of the bifrontal and lateral temporal regions. This is a pattern that can be seen in generalized epilepsy syndromes. In this exam, relative deficits were noted in attention, learning, executive functioning, bilateral fine motor speed, visuoperception, and some aspects of executive functioning. Other cognitive abilities were performed in keeping with his likely low average range cognitive baseline. There is some degree of variability in his memory, although he clearly demonstrated the capacity for broadly normal anterograde memory. That said, it could be the case that his constellation of weaknesses in attention and cognitive speed could be perceived as a  memory  deficit in day-to-day life. He is also reporting elevated symptoms of depression and anxiety. While I do not think that psychological factors are responsible for the above noted cognitive weaknesses and deficits, it could be the case that these factors are contributing to some degree of variability in his cognition.    RECOMMENDATIONS  Preliminary results and feedback were provided to the patient on the date of the appointment, and all questions were answered.    1. Consideration could be given to treatment of his mental health. If medically indicated, trial of an antidepressant could be considered.     2. Along similar lines, referral for psychotherapy services could also be of benefit. One possible referral option would be [facility], with locations throughout the [facility]. They can be reached by calling [phone].    3. If he continues to experience difficulties with memory, routine use of a memory notebook or other assistive device could be of benefit.    4. Follow-up neuropsychological assessment could be considered in one year if changes are noted or if clinically indicated.    Jl Greenwood, Ph.D., L.P., ABPP-CN   / Licensed Psychologist [id number]  Department of Rehabilitation Medicine  Division of  Adult Neuropsychology  [facility]    Time spent:  four hours professional time, including interview, record review, data integration, and report writing (CPT 21083); four hours of testing administered by a psychometrist and interpreted by a neuropsychologist (CPT 01692). Diagnoses: G40.309, F33.0, F41.9.   Jl Greenwood, PhD LP

## 2017-08-10 ENCOUNTER — OFFICE VISIT (OUTPATIENT)
Dept: NEUROLOGY | Facility: CLINIC | Age: 47
End: 2017-08-10

## 2017-08-10 VITALS
HEART RATE: 66 BPM | WEIGHT: 197.8 LBS | BODY MASS INDEX: 28.32 KG/M2 | SYSTOLIC BLOOD PRESSURE: 131 MMHG | DIASTOLIC BLOOD PRESSURE: 71 MMHG | HEIGHT: 70 IN

## 2017-08-10 DIAGNOSIS — G40.909 SEIZURE DISORDER (H): ICD-10-CM

## 2017-08-10 RX ORDER — LAMOTRIGINE 200 MG/1
200 TABLET ORAL 2 TIMES DAILY
Qty: 60 TABLET | Refills: 5 | Status: SHIPPED | OUTPATIENT
Start: 2017-08-10 | End: 2018-02-05

## 2017-08-10 NOTE — MR AVS SNAPSHOT
After Visit Summary   8/10/2017    Johnson Crystal    MRN: [id number]           Patient Information     Date Of Birth          1970        Visit Information        Provider Department      8/10/2017 11:00 AM Leigh Ann Fong MD [facility]        Today's Diagnoses     Seizure disorder (H)          Care Instructions      Times of Days  am  Pm (before 6 pm)       Medication Tablet Size Number of Tablets/Capsules Total Daily Dosage    lamotrigine  200 mg  1    1                                              lamotrigine   150 mg  0    0                               Times of Days  am  noon  pm     Medication Tablet Size Number of Tablets/Capsules Total Daily Dosage    levetiracetam (Keppra)  750 mg  2   2     2      wk 1  750  2    1    2      wk 2 and thereafter    2    0    2                           - Stop taking lamotrigine 150 mg tabs    Carry this with you at all times.  CONTINUE TAKING YOUR OTHER MEDICATIONS AS PREVIOUSLY DIRECTED.      * * *Do not store medications in the bathroom.  Keep medications away from children!* * *             Follow-ups after your visit        Follow-up notes from your care team     Return in about 4 months (around 12/10/2017).      Your next 10 appointments already scheduled     Dec 11, 2017  3:30 PM CST   Return Visit with MD [facility] ([facility])    [address], [address]  [facility] [address] [phone]              Who to contact     Please call your clinic at [phone] to:    Ask questions about your health    Make or cancel appointments    Discuss your medicines    Learn about your test results    Speak to your doctor   If you have compliments or concerns about an experience at your clinic, or if you wish to file a complaint, please contact [facility] Relations at [phone] or email us at [email or web address]         Additional  "Information About Your Visit        [employer] Information     [employer] is an electronic gateway that provides easy, online access to your medical records. With [employer], you can request a clinic appointment, read your test results, renew a prescription or communicate with your care team.     To sign up for [employer] visit the website at [email or web address]   You will be asked to enter the access code listed below, as well as some personal information. Please follow the directions to create your username and password.     Your access code is: [email or web address]  Expires: 2017 11:00 AM     Your access code will  in 90 days. If you need help or a new code, please contact your [facility] or call [phone] for assistance.        Care EveryWhere ID     This is your Care EveryWhere ID. This could be used by other organizations to access your [place] medical records  [id number]        Your Vitals Were     Pulse Height BMI (Body Mass Index)             66 5' 10\" (177.8 cm) 28.38 kg/m2          Blood Pressure from Last 3 Encounters:   08/10/17 131/71   17 130/90   17 119/78    Weight from Last 3 Encounters:   08/10/17 197 lb 12.8 oz (89.7 kg)   17 188 lb 3.2 oz (85.4 kg)   05/15/17 192 lb 9.6 oz (87.4 kg)              Today, you had the following     No orders found for display         Today's Medication Changes          These changes are accurate as of: 8/10/17 11:57 AM.  If you have any questions, ask your nurse or doctor.               These medicines have changed or have updated prescriptions.        Dose/Directions    * lamoTRIgine 150 MG tablet   Commonly known as:  LaMICtal   This may have changed:  Another medication with the same name was changed. Make sure you understand how and when to take each.   Used for:  Seizure disorder (H)   Changed by:  Leigh Ann Fong MD        Dose:  300 mg   Take 2 tablets (300 mg) by mouth every evening   Quantity:  60 " tablet   Refills:  3       * lamoTRIgine 200 MG tablet   Commonly known as:  LaMICtal   This may have changed:  when to take this   Used for:  Seizure disorder (H)   Changed by:  Leigh Ann Fong MD        Dose:  200 mg   Take 1 tablet (200 mg) by mouth 2 times daily   Quantity:  60 tablet   Refills:  5       * Notice:  This list has 2 medication(s) that are the same as other medications prescribed for you. Read the directions carefully, and ask your doctor or other care provider to review them with you.         Where to get your medicines      These medications were sent to [facility] [address]  [address], [place] [address]     Phone:  [phone]     lamoTRIgine 200 MG tablet                Primary Care Provider Office Phone # Fax #    Earl Covarrubias [phone]       [facility] [address] [place] [address]        Equal Access to Services     CECELIA BOWEN AH: Hadii yesica ortiz hadasho Soomaali, waaxda luqadaha, qaybta kaalmada adeegyada, gillian ellerin vangie cadet. So [facility] [phone].    ATENCIÓN: Si dara medina, tiene a sanon disposición servicios gratuitos de asistencia lingüística. Llame al [phone].    We comply with applicable federal civil rights laws and [place] laws. We do not discriminate on the basis of race, color, national origin, age, disability sex, sexual orientation or gender identity.            Thank you!     Thank you for choosing [facility]  for your care. Our goal is always to provide you with excellent care. Hearing back from our patients is one way we can continue to improve our services. Please take a few minutes to complete the written survey that you may receive in the mail after your visit with us. Thank you!             Your Updated Medication List - Protect others around you: Learn how to safely use, store and throw away your medicines at www.disposemymeds.org.           This list is accurate as of: 8/10/17 11:57 AM.  Always use your most recent med list.                   Brand Name Dispense Instructions for use Diagnosis    * divalproex 500 MG 24 hr tablet    DEPAKOTE ER    60 tablet    Take 1 tablet (500 mg) by mouth 2 times daily    Seizure disorder (H)       * divalproex 250 MG 24 hr tablet    DEPAKOTE ER    60 tablet    Take 1 tab twice a day (in addition to 500 mg bid) one week after starting Depakote 500 mg    Seizure disorder (H)       KEPPRA PO      Take 1,500 mg by mouth 3 times daily        * lamoTRIgine 150 MG tablet    LaMICtal    60 tablet    Take 2 tablets (300 mg) by mouth every evening    Seizure disorder (H)       * lamoTRIgine 200 MG tablet    LaMICtal    60 tablet    Take 1 tablet (200 mg) by mouth 2 times daily    Seizure disorder (H)       MELATONIN PO      Take 5-10 mg by mouth        * Notice:  This list has 4 medication(s) that are the same as other medications prescribed for you. Read the directions carefully, and ask your doctor or other care provider to review them with you.

## 2017-08-10 NOTE — PROGRESS NOTES
Name: Johnson Crystal  MR#: [id number]  YOB: 1970  Date of Exam: 08/08/2017      Neuropsychology Laboratory  [facility] [place]  [address], [address]  [place] [address]  [phone]    NEUROPSYCHOLOGICAL EVALUATION    IDENTIFYING INFORMATION  Johnson Crystal is a 47 year old, right handed,  (on long-term disability), with 12+ years of formal education. He was unaccompanied to the evaluation.     BACKGROUND INFORMATION / INTERVIEW FINDINGS    Records indicate that Mr. Crystal began suffering from seizures in childhood. He has had one generalized tonic-clonic seizure (on May 23, 2017), and also has staring spells that have occurred more regularly. Please see Dr. Leigh Ann Fong s note from May 31, 2017 for more detailed background information and history of his seizures. MRI of his brain at the [facility] on April 10, 2017 documented  1. No acute abnormality. 2. A few punctate foci of signal loss, likely hemosiderin deposition, identified in the cerebellar hemispheres on the gradient echo sequence. These most likely represent remote micro hemorrhages, and could be due to previous trauma or microangiopathic. No evidence of recent significant hemorrhage.  EEG monitoring on May 30, 2017 documented generalized discharges, but no seizures were noted. His other medical history includes renal oncocytoma status post partial nephrectomy in September, 2016. He has expressed concerns about his memory. The current evaluation was requested by Dr. Fong, in this context.    On interview, Mr. Crystal confirmed the above history. He reported that he began suffering from seizures at approximately age 9 to 11, after he fell out of a hayloft in a barn. He indicated that he did not lose consciousness after this injury, but maybe had a headache afterwards. He denied a change in his cognitive functioning after this injury. He stated that he had   daydreaming  type seizures after this injury, and these same types of seizures are still occurring. He reported that the seizures now occur less often than they used to. He reported that he now has a daydreaming type seizure every three months or so. He indicated that the last of these seizures occurred in February. He also noted that he recently has had brief lapses in attention. He reported that these lapses may last anywhere between 1 to 4 seconds. He reported that these events are triggered by intense thinking and bright lights. He indicated that he may have had one of these events on the drive to the appointment.    Regarding cognition, Mr. Crystal stated that he has troubles with his memory. He indicated that these issues have been present for the last couple of years, and seem to be getting worse. He reported that he misplaces items, and may forget other day-to-day information. He denied having identified a trigger for onset of these symptoms. He also noted that his seizure medicines make him more emotional. He otherwise denied having identified cognitive changes or difficulties. Regarding mood, he reported feeling pretty laid-back. He denied irritability. He denied prior mental health diagnoses or treatments. He denied suicidal ideation.    With respect to other medical background, Mr. Crystal denied prior known TBI or stroke. Per records, his current medications include divalproex, lamotrigine, levetiracetam, and melatonin. He denied alcohol, tobacco, or illicit drug use. He denied past treatment or abuse for alcohol or drugs.    Mr. Crystal lives at home with his family. He manages his own basic daily activities, his own medications, and his own finances. He reported that his wife and children are primarily responsible for preparing their meals. He does not drive. By way of background, he and his wife have been  since 1997. They have four children, ages 16, 15, 13, and 6. Three of his children are  adopted. He noted some stress with one of his children. He reported that he completed the first and second grades in public school, but was otherwise homeschooled. He reported that he may have been held back one year, but was not certain. He indicated that he received poor to average grades in school. 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 He has worked as a  for 20 years. He is currently on long-term disability since his generalized seizure in May.    BEHAVIORAL OBSERVATIONS  Mr. Crystal was polite and cooperative during the exam. He worked slowly. His speech was notable for mild to moderate difficulties with word finding, mild dysfluency, mild dysarticulation, and occasional paraphasias. Prosody was normal. Comprehension was mildly impaired, as he was noted to need simplification and repetition of task instructions. His thought processes were slowed and tangential. His mood was mildly anxious with congruent affect. He was noted to become tearful on one measure. His effort was adequate, as he was noted to be hesitant to guess on items that he did not know. The current results are felt to be a broadly accurate representation of his cognitive functioning.    RESULTS OF EXAM  His performances on measures of neuropsychological functioning were as follows:      He misstated the name of the city, but was otherwise oriented to place. He was fully oriented to time and various aspects of personal information. Performance on a measure of single word reading was low average. Auditory attention for digits was impaired. Mental calculations were impaired. Learning of words in a list format was borderline impaired. Short delayed recall of list words was low average. 30 minute delayed recall of list words was borderline impaired. Delayed recognition of list words was borderline impaired. Learning of story information was low average. Delayed recall of this  information was average. Delayed recognition of story information was borderline impaired. Learning and delayed recognition of faces were both performed in the average range. His drawing of a complicated geometric figure was impaired, and was notable for a slow and piecemeal approach with inattention to the figure s details. Short delayed recall of the figure was borderline impaired. 30 minute delayed recall of the figure was borderline impaired. Delayed recognition of the figure s elements was average. Visuoperceptual matching of faces was performed in the borderline impaired range. Visual problem solving with blocks was borderline impaired. Comprehension of phrases and short stories was borderline impaired. Verbal associative fluency was borderline impaired. Semantic verbal fluency was borderline impaired. Naming to confrontation was impaired. Verbal abstract reasoning was low average. Fund of knowledge was average. Speeded visual sequencing under focused attention was impaired. A similar measure with a divided attention component was impaired, and discontinued at the test s time limit with one error. Speeded word reading was low average. Speeded color naming was low average. Speeded inhibition of an overlearned response was low average. Speeded visuomotor coding was average. Speeded fine motor dexterity was impaired, bilaterally. He had four peg drops with the dominant, right hand, and two peg drops with the left hand.    He endorsed items consistent with mild symptoms of depression, and mild symptoms of anxiety on self-report measures.    IMPRESSIONS  Mr. Crystal demonstrated a pattern of weaknesses and deficits that is consistent with relative dysfunction of the bifrontal and lateral temporal regions. This is a pattern that can be seen in generalized epilepsy syndromes. In this exam, relative deficits were noted in attention, learning, executive functioning, bilateral fine motor speed, visuoperception, and some  aspects of executive functioning. Other cognitive abilities were performed in keeping with his likely low average range cognitive baseline. There is some degree of variability in his memory, although he clearly demonstrated the capacity for broadly normal anterograde memory. That said, it could be the case that his constellation of weaknesses in attention and cognitive speed could be perceived as a  memory  deficit in day-to-day life. He is also reporting elevated symptoms of depression and anxiety. While I do not think that psychological factors are responsible for the above noted cognitive weaknesses and deficits, it could be the case that these factors are contributing to some degree of variability in his cognition.    RECOMMENDATIONS  Preliminary results and feedback were provided to the patient on the date of the appointment, and all questions were answered.    1. Consideration could be given to treatment of his mental health. If medically indicated, trial of an antidepressant could be considered.     2. Along similar lines, referral for psychotherapy services could also be of benefit. One possible referral option would be [facility], with locations throughout the [facility]. They can be reached by calling [phone].    3. If he continues to experience difficulties with memory, routine use of a memory notebook or other assistive device could be of benefit.    4. Follow-up neuropsychological assessment could be considered in one year if changes are noted or if clinically indicated.    Jl Greenwood, Ph.D., L.P., ABPP-CN   / Licensed Psychologist [id number]  Department of Rehabilitation Medicine  Division of Adult Neuropsychology  [facility]    Time spent:  four hours professional time, including interview, record review, data integration, and report writing (CPT 94358); four hours of testing administered by a psychometrist and interpreted by a neuropsychologist  (CPT 65789). Diagnoses: G40.309, F33.0, F41.9.

## 2017-08-10 NOTE — PROGRESS NOTES
__ Orientation            Time          __ -0 ____        Place        _____1____        Personal Info.     _____4____    __WAIS-IV   FSIQ____VCI_____PRI_____ WMI____PSI___     Raw  Age SS  __Similarities  __24__               ___6___  __Vocabulary  _______ _______  __Information  ___10___          ___8____  __Comprehension _______ _______  __Block Design  __16___ ___5___  __Matrix Reas.  _______ ______  __Visual Puzzles _______ _______  __Picture Comp. _______ _______  __Figure Weights _______ _______  __Digit Span  __ 13___ ___3___  __Arithmetic  ___6___             ___3___  __L-N Sequencing _______ _______  __Symbol Search _______ _______  __Coding  __53___ ___8___  __Cancellation  _______ _______    __AVLT  Trial   1         2         3         4          5        B         6            _4_    _6_     _7_     _9_    _10_   _5_     _8_      Raw              Z-Score  Learning   _36__  -1.76  Short Delay   __8___  -.92  30 min. delayed recall  __6___  -1.5  Recognition hits   __12__  -1.43  Recognition false positives __3___        -    __Maura/Kellen Complex Figure Test    Raw  T-score   %ile  Copy   _23.0__         -                 ?1  Imm. Recall _12.0__  ___32___ __4__  30  Delay _13.0__  ___33___ __4__  Recognition _20__      ___46___ __34__  Time               600 __                   _?1__    __WRAT-4   Reading SS = 80   %ile = 9    GE = 6.9      __COWA   Raw__18___ Tscore__31___   __Semantic Fluency/Animals   Raw = 14     T-score = 35  __BNT   Raw = 43/60 T-Score = 27  __Complex Ideational Material   Raw = 10/12 T-Score = 36    __Trail Making Test   A =   60         Errors = 0    TScore = 29   B =  300        Errors = 1    Tscore = 9  __Stroop                       Raw   +    Tyrone   =  Total       T-Score           Word = _76_      __8__  __84__  __ 38__        Color =  _58_      __4__  __62__  __ 38__        C/W   =  _29_     __ 5__  __34__  _   39__    __Benton Facial Recognition   Raw = 39     Interp: BDL    __Grooved Pegboard   RH = 150          Tscore = 19      Drops = 4   LH =  158          TScore = 23      Drops =2    __BDI-II   Raw__19__ Interp.__ Mild ___   __BAI     Raw__12__ Interp. __Mild___      __WMS-III   LM1 =26  SS = 6   LM2 = 16 SS = 8   LM2R = 21 Zscore = -1.66   Faces1= 36 SS=10   Faces2= 38 SS=11

## 2017-08-10 NOTE — PROGRESS NOTES
[facility]      Patient:  Lucien Crystal  :  1970   Age:  47 year old   Today's Office Visit:  8/10/2017      History of Present Illness:    The patient did not have any seizures since he was started on Depakote.  He was started on Depakote in May.  His lamotrigine was decreased to 200-300 mg per day.  He has some episodes with feeling room spinning, and then he gets tired.  They last a few hours.  He feels his eyes are bugging out.  He has had these since January when was started on lamotrigine.  He has this feeling every day after taking lamotrigine in the morning, but not after his night dose.  His last seizure was May .  No seizures after starting Depakote.  He is taking Depakote 750 mg bid.  He is also taking Keppra 750 mg 3 times a day.  He complains of tremor which is more noticeable in left hand.   He had neuropsychological evaluation 2 days ago. The results are not in the chart yet.     Current Outpatient Prescriptions   Medication Sig Dispense Refill     divalproex (DEPAKOTE ER) 500 MG 24 hr tablet Take 1 tablet (500 mg) by mouth 2 times daily 60 tablet 3     divalproex (DEPAKOTE ER) 250 MG 24 hr tablet Take 1 tab twice a day (in addition to 500 mg bid) one week after starting Depakote 500 mg 60 tablet 3     lamoTRIgine (LAMICTAL) 200 MG tablet Take 1 tablet (200 mg) by mouth daily (with breakfast) 30 tablet 3     lamoTRIgine (LAMICTAL) 150 MG tablet Take 2 tablets (300 mg) by mouth every evening 60 tablet 3     LevETIRAcetam (KEPPRA PO) Take 1,500 mg by mouth 3 times daily       MELATONIN PO Take 5-10 mg by mouth        Results for LUCIEN CRYSTAL (MRN [id number]) as of 2017 15:02   Ref. Range 7/10/2017 11:49   Keppra (Levetiracetam) Level Latest Ref Range: 6.0 - 46.0 ug/mL 56.1 (A)   Lamotrigine Level Latest Ref Range: 3.0 - 15.0 ug/mL 15.9 (A)   Valproic Acid Free Latest Ref Range: 6.0 - 20.0 ug/mL 8.7   Valproic Acid Total Latest Ref Range: 50.0 - 100.0  "ug/mL 74.9     Review of Systems:  Lethargy / Tiredness:  No  Nausea / Vomiting:  No  Double Vision:  Yes  Sleepiness:  No  Depression:  No  Slowed Cognitive Function:  No  Memory Problems:  Yes  Poor Balance:  No  Dizziness:  No  Appetite Changes:  No  Blurred Vision:  Yes  Sleep Changes:  Yes  Behavioral Changes:  No  Skin: negative  Respiratory: No shortness of breath and No cough  Cardiovascular: negative  Have you experienced a traumatic fall since your last visit: NO  Are these falls related to your seizures: NO    Exam:    /71 (BP Location: Right arm, Patient Position: Chair, Cuff Size: Adult Large)  Pulse 66  Ht 5' 10\" (177.8 cm)  Wt 197 lb 12.8 oz (89.7 kg)  BMI 28.38 kg/m2     Wt Readings from Last 5 Encounters:   08/10/17 197 lb 12.8 oz (89.7 kg)   05/30/17 188 lb 3.2 oz (85.4 kg)   05/15/17 192 lb 9.6 oz (87.4 kg)     General Appearance: Alert, awake, NAD  LANGUAGE/SPEECH:  No aphasia or dysarthria.   Extraocular movements are intact.  No nystagmus.    Face is symmetric.    Hearing is intact to voice.   MOTOR:  Normal tone, bulk and strength 5/5 bilaterally with no drift.    COORDINATION:  Normal finger-to-nose.   GAIT:  Steady.  He has trouble performing tandem gait.      Assessment and Plan:     1) Primary generalized epilepsy:  The patient did not have any seizures.  He is on combination of Depakote, Keppra, and lamotrigine.  He feels vertiginous after morning dose of lamotrigine.  I advised him to take his lamotrigine with food.  We also decided to decrease his lamotrigine by 100 mg daily, since the level may have increased upon starting Depakote.  We also decided to decrease Keppra since it didn't seem to be very effective, and it seems Depakote has been most helpful.     2) Tremor left arm: Depakote might have worsened his tremor.  It's not that bothersome at this point.  We will keep an eye on it.      - Decrease lamotrigine to 200 mg bid  - Continue other AEDs as before  - RTC in 4 " months      As described above, I met with the patient for 25 minutes and during this time counseling was 50% of the visit time.  Leigh Ann Fong MD

## 2017-08-10 NOTE — LETTER
8/10/2017       RE: Lucien Crystal  : 1970   MRN: [id number]      Dear Colleague,    Thank you for referring your patient, Lucien Crystal, to the [facility] EPILEPS[facility]ARE at [facility]. Please see a copy of my visit note below.    [facility] Epilepsy Care Progress Note      Patient:  Lucien Crystal  :  1970   Age:  47 year old   Today's Office Visit:  8/10/2017      History of Present Illness:    The patient did not have any seizures since he was started on Depakote.  He was started on Depakote in May.  His lamotrigine was decreased to 200-300 mg per day.  He has some episodes with feeling room spinning, and then he gets tired.  They last a few hours.  He feels his eyes are bugging out.  He has had these since January when was started on lamotrigine.  He has this feeling every day after taking lamotrigine in the morning, but not after his night dose.  His last seizure was May .  No seizures after starting Depakote.  He is taking Depakote 750 mg bid.  He is also taking Keppra 750 mg 3 times a day.  He complains of tremor which is more noticeable in left hand.   He had neuropsychological evaluation 2 days ago. The results are not in the chart yet.     Current Outpatient Prescriptions   Medication Sig Dispense Refill     divalproex (DEPAKOTE ER) 500 MG 24 hr tablet Take 1 tablet (500 mg) by mouth 2 times daily 60 tablet 3     divalproex (DEPAKOTE ER) 250 MG 24 hr tablet Take 1 tab twice a day (in addition to 500 mg bid) one week after starting Depakote 500 mg 60 tablet 3     lamoTRIgine (LAMICTAL) 200 MG tablet Take 1 tablet (200 mg) by mouth daily (with breakfast) 30 tablet 3     lamoTRIgine (LAMICTAL) 150 MG tablet Take 2 tablets (300 mg) by mouth every evening 60 tablet 3     LevETIRAcetam (KEPPRA PO) Take 1,500 mg by mouth 3 times daily       MELATONIN PO Take 5-10 mg by mouth        Results for LUCIEN CRYSTAL (MRN [id number]) as of 2017 15:02   Ref. Range  "7/10/2017 11:49   Keppra (Levetiracetam) Level Latest Ref Range: 6.0 - 46.0 ug/mL 56.1 (A)   Lamotrigine Level Latest Ref Range: 3.0 - 15.0 ug/mL 15.9 (A)   Valproic Acid Free Latest Ref Range: 6.0 - 20.0 ug/mL 8.7   Valproic Acid Total Latest Ref Range: 50.0 - 100.0 ug/mL 74.9     Review of Systems:  Lethargy / Tiredness:  No  Nausea / Vomiting:  No  Double Vision:  Yes  Sleepiness:  No  Depression:  No  Slowed Cognitive Function:  No  Memory Problems:  Yes  Poor Balance:  No  Dizziness:  No  Appetite Changes:  No  Blurred Vision:  Yes  Sleep Changes:  Yes  Behavioral Changes:  No  Skin: negative  Respiratory: No shortness of breath and No cough  Cardiovascular: negative  Have you experienced a traumatic fall since your last visit: NO  Are these falls related to your seizures: NO    Exam:    /71 (BP Location: Right arm, Patient Position: Chair, Cuff Size: Adult Large)  Pulse 66  Ht 5' 10\" (177.8 cm)  Wt 197 lb 12.8 oz (89.7 kg)  BMI 28.38 kg/m2     Wt Readings from Last 5 Encounters:   08/10/17 197 lb 12.8 oz (89.7 kg)   05/30/17 188 lb 3.2 oz (85.4 kg)   05/15/17 192 lb 9.6 oz (87.4 kg)     General Appearance: Alert, awake, NAD  LANGUAGE/SPEECH:  No aphasia or dysarthria.   Extraocular movements are intact.  No nystagmus.    Face is symmetric.    Hearing is intact to voice.   MOTOR:  Normal tone, bulk and strength 5/5 bilaterally with no drift.    COORDINATION:  Normal finger-to-nose.   GAIT:  Steady.  He has trouble performing tandem gait.      Assessment and Plan:     1) Primary generalized epilepsy:  The patient did not have any seizures.  He is on combination of Depakote, Keppra, and lamotrigine.  He feels vertiginous after morning dose of lamotrigine.  I advised him to take his lamotrigine with food.  We also decided to decrease his lamotrigine by 100 mg daily, since the level may have increased upon starting Depakote.  We also decided to decrease Keppra since it didn't seem to be very effective, " and it seems Depakote has been most helpful.     2) Tremor left arm: Depakote might have worsened his tremor.  It's not that bothersome at this point.  We will keep an eye on it.      - Decrease lamotrigine to 200 mg bid  - Continue other AEDs as before  - RTC in 4 months      As described above, I met with the patient for 25 minutes and during this time counseling was 50% of the visit time.  Leigh Ann Fong MD

## 2017-09-11 RX ORDER — LEVETIRACETAM 750 MG/1
1500 TABLET ORAL 2 TIMES DAILY
Qty: 120 TABLET | Refills: 3 | Status: SHIPPED | OUTPATIENT
Start: 2017-09-11 | End: 2018-02-05

## 2017-09-21 DIAGNOSIS — G40.909 SEIZURE DISORDER (H): Primary | ICD-10-CM

## 2017-09-21 RX ORDER — DIVALPROEX SODIUM 500 MG/1
TABLET, EXTENDED RELEASE ORAL
Qty: 60 TABLET | Refills: 3 | Status: SHIPPED | OUTPATIENT
Start: 2017-09-21 | End: 2018-02-05

## 2017-10-02 ENCOUNTER — TELEPHONE (OUTPATIENT)
Dept: NEUROLOGY | Facility: CLINIC | Age: 47
End: 2017-10-02

## 2017-10-02 DIAGNOSIS — G40.909 SEIZURE DISORDER (H): ICD-10-CM

## 2017-10-02 RX ORDER — DIVALPROEX SODIUM 250 MG/1
TABLET, EXTENDED RELEASE ORAL
Qty: 180 TABLET | Refills: 0 | Status: SHIPPED | OUTPATIENT
Start: 2017-10-02 | End: 2018-02-05 | Stop reason: DRUGHIGH

## 2017-10-02 NOTE — TELEPHONE ENCOUNTER
----- Message from Jacqui Capps CMA sent at 10/2/2017  4:20 PM CDT -----  Regarding: refill  Patient needs a refill on divalproex (DEPAKOTE ER) 250 MG 24 hr tablet, taking 1 tab BID. Needs 30 days supply with refills.    Pharmacy:    [facility] - [address]      RTC date: 12/11/17

## 2017-11-03 DIAGNOSIS — G40.909 SEIZURE DISORDER (H): ICD-10-CM

## 2017-11-06 RX ORDER — LAMOTRIGINE 150 MG/1
TABLET ORAL
Qty: 60 TABLET | Refills: 0 | OUTPATIENT
Start: 2017-11-06

## 2017-12-01 ENCOUNTER — TELEPHONE (OUTPATIENT)
Dept: NEUROLOGY | Facility: CLINIC | Age: 47
End: 2017-12-01

## 2017-12-01 DIAGNOSIS — G40.909 SEIZURE DISORDER (H): Primary | ICD-10-CM

## 2017-12-01 RX ORDER — LAMOTRIGINE 150 MG/1
300 TABLET ORAL EVERY EVENING
Qty: 60 TABLET | Refills: 0 | Status: SHIPPED | OUTPATIENT
Start: 2017-12-01 | End: 2018-02-05

## 2018-02-03 DIAGNOSIS — G40.909 SEIZURE DISORDER (H): ICD-10-CM

## 2018-02-03 RX ORDER — LEVETIRACETAM 750 MG/1
TABLET ORAL
Qty: 120 TABLET | Refills: 0 | Status: CANCELLED | OUTPATIENT
Start: 2018-02-03

## 2018-02-03 RX ORDER — DIVALPROEX SODIUM 500 MG/1
TABLET, EXTENDED RELEASE ORAL
Qty: 60 TABLET | Refills: 0 | Status: CANCELLED | OUTPATIENT
Start: 2018-02-03

## 2018-02-05 ENCOUNTER — OFFICE VISIT (OUTPATIENT)
Dept: NEUROLOGY | Facility: CLINIC | Age: 48
End: 2018-02-05
Payer: COMMERCIAL

## 2018-02-05 VITALS
HEART RATE: 69 BPM | BODY MASS INDEX: 29.96 KG/M2 | SYSTOLIC BLOOD PRESSURE: 156 MMHG | HEIGHT: 71 IN | WEIGHT: 214 LBS | DIASTOLIC BLOOD PRESSURE: 87 MMHG | RESPIRATION RATE: 16 BRPM | TEMPERATURE: 97.9 F

## 2018-02-05 DIAGNOSIS — G40.319 INTRACTABLE GENERALIZED IDIOPATHIC EPILEPSY WITHOUT STATUS EPILEPTICUS (H): Primary | ICD-10-CM

## 2018-02-05 DIAGNOSIS — G40.909 SEIZURE DISORDER (H): ICD-10-CM

## 2018-02-05 RX ORDER — DIVALPROEX SODIUM 500 MG/1
TABLET, EXTENDED RELEASE ORAL
Qty: 360 TABLET | Refills: 1 | Status: SHIPPED | OUTPATIENT
Start: 2018-02-05 | End: 2018-07-27

## 2018-02-05 RX ORDER — LAMOTRIGINE 200 MG/1
200 TABLET ORAL 2 TIMES DAILY
Qty: 180 TABLET | Refills: 1 | Status: SHIPPED | OUTPATIENT
Start: 2018-02-05 | End: 2018-08-30

## 2018-02-05 RX ORDER — LEVETIRACETAM 750 MG/1
1500 TABLET ORAL 2 TIMES DAILY
Qty: 360 TABLET | Refills: 1 | Status: SHIPPED | OUTPATIENT
Start: 2018-02-05 | End: 2018-08-10

## 2018-02-05 RX ORDER — DIVALPROEX SODIUM 250 MG/1
TABLET, EXTENDED RELEASE ORAL
Qty: 60 TABLET | Refills: 0 | OUTPATIENT
Start: 2018-02-05

## 2018-02-05 ASSESSMENT — PAIN SCALES - GENERAL: PAINLEVEL: NO PAIN (0)

## 2018-02-05 NOTE — LETTER
"2018       RE: Lucien Crystal  : 1970   MRN: [id number]      Dear Colleague,    Thank you for referring your patient, Lucien Crystal, to the [facility] at [facility]. Please see a copy of my visit note below.    [facility] Progress Note      Patient:  Lucien Crystal  :  1970   Age:  47 year old   Today's Office Visit:  2018      History of Present Illness:     The patient had a GTC seizure on .  He was watching TV and then lost consciousness.  He doesn't know how long it lasted. There was no witness to observe the seizure.  45 minutes before the seizure he felt a little dizzy, spinning sensation, lasted a minute.    He also had \"smaller seizures' like a daydream, probably staring spells.  He notices he had them after the seizure.  If he is talking to someone, he sometimes asks \"what were you talking about?\" after the seizure.  They happened once or twice a month.    He is taking lamotrigine, levetitacetam and Depakote.  Has some tiredness and occasional sleep problems; his imbalance is getting better.     Current Outpatient Prescriptions   Medication Sig Dispense Refill     lamoTRIgine (LAMICTAL) 150 MG tablet Take 2 tablets (300 mg) by mouth every evening 60 tablet 0     divalproex (DEPAKOTE ER) 250 MG 24 hr tablet Take 1 tab twice a day (in addition to 500 mg bid) one week after starting Depakote 500 mg 180 tablet 0     divalproex (DEPAKOTE ER) 500 MG 24 hr tablet Take one tablet (500 mg) twice daily 60 tablet 3     levETIRAcetam (KEPPRA) 750 MG tablet Take 2 tablets (1,500 mg) by mouth 2 times daily 120 tablet 3     lamoTRIgine (LAMICTAL) 200 MG tablet Take 1 tablet (200 mg) by mouth 2 times daily 60 tablet 5     LevETIRAcetam (KEPPRA PO) Take 1,500 mg by mouth 3 times daily       MELATONIN PO Take 5-10 mg by mouth          Results for LUCIEN CRYSTAL (MRN [id number]) as of 2018 10:59   Ref. Range 7/10/2017 11:49 " "  Keppra (Levetiracetam) Level Latest Ref Range: 6.0 - 46.0 ug/mL 56.1 (A)   Lamotrigine Level Latest Ref Range: 3.0 - 15.0 ug/mL 15.9 (A)   Valproic Acid Free Latest Ref Range: 6.0 - 20.0 ug/mL 8.7   Valproic Acid Total Latest Ref Range: 50.0 - 100.0 ug/mL 74.9     Review of Systems:  Lethargy / Tiredness:  Yes, he takes naps during the day.   Nausea / Vomiting:  No  Double Vision:  rarely  Sleepiness:  Yes  Depression:  Yes  Memory Problems:  Yes  Poor Balance:  Yes, getting better  Dizziness: somewhat  Appetite Changes: a little  Blurred Vision: sometimes  Sleep Problems: sometimes  Behavioral Changes:  No  Skin: negative  Respiratory: No shortness of breath, or cough  Cardiovascular: negative  Have you experienced a traumatic fall since your last visit: NO    Other Issues:    Is patient safe to drive:  No.    Exam:  /87 (BP Location: Right arm, Patient Position: Chair, Cuff Size: Adult Regular)  Pulse 69  Temp 97.9  F (36.6  C)  Resp 16  Ht 5' 10.67\" (179.5 cm)  Wt 214 lb (97.1 kg)  BMI 30.13 kg/m2    Wt Readings from Last 5 Encounters:   08/10/17 197 lb 12.8 oz (89.7 kg)   05/30/17 188 lb 3.2 oz (85.4 kg)   05/15/17 192 lb 9.6 oz (87.4 kg)     General Appearance: Alert, awake, NAD  LANGUAGE/SPEECH:  No aphasia or dysarthria.   Extraocular movements are intact.  No nystagmus.    Face is symmetric.    Hearing is intact to voice.   MOTOR:  Normal tone, bulk and strength 5/5 bilaterally with no drift.    COORDINATION: Slight tremor in finger-to-nose bilaterally.   GAIT:  Steady.  He has trouble performing tandem gait.      Assessment and Plan:    1) Primary generalized epilepsy: The patient had a GTC seizure end of November and probable absence seizures once or twice a month.  He is taking  mg bid, LVT 1500 mg bid and  mg bid.  Kappra level was supratherapeutic, he feels depressed, LMT level is good.  I discussed increasing Depakote to 1000 mg bid.  He agrees.    - Increase Depakote to " 1000 mg bid     - Continue other AEDs as before    - RTC in 6 months      As described above, I met with the patient for 30 minutes and during this time counseling was greater than 50% of the visit time.  Leigh Ann Fong MD                    Again, thank you for allowing me to participate in the care of your patient.      Sincerely,    Leigh Ann Fong MD

## 2018-02-05 NOTE — PROGRESS NOTES
"[facility]      Patient:  Lucien Crystal  :  1970   Age:  47 year old   Today's Office Visit:  2018      History of Present Illness:     The patient had a GTC seizure on .  He was watching TV and then lost consciousness.  He doesn't know how long it lasted. There was no witness to observe the seizure.  45 minutes before the seizure he felt a little dizzy, spinning sensation, lasted a minute.    He also had \"smaller seizures' like a daydream, probably staring spells.  He notices he had them after the seizure.  If he is talking to someone, he sometimes asks \"what were you talking about?\" after the seizure.  They happened once or twice a month.    He is taking lamotrigine, levetitacetam and Depakote.  Has some tiredness and occasional sleep problems; his imbalance is getting better.     Current Outpatient Prescriptions   Medication Sig Dispense Refill     lamoTRIgine (LAMICTAL) 150 MG tablet Take 2 tablets (300 mg) by mouth every evening 60 tablet 0     divalproex (DEPAKOTE ER) 250 MG 24 hr tablet Take 1 tab twice a day (in addition to 500 mg bid) one week after starting Depakote 500 mg 180 tablet 0     divalproex (DEPAKOTE ER) 500 MG 24 hr tablet Take one tablet (500 mg) twice daily 60 tablet 3     levETIRAcetam (KEPPRA) 750 MG tablet Take 2 tablets (1,500 mg) by mouth 2 times daily 120 tablet 3     lamoTRIgine (LAMICTAL) 200 MG tablet Take 1 tablet (200 mg) by mouth 2 times daily 60 tablet 5     LevETIRAcetam (KEPPRA PO) Take 1,500 mg by mouth 3 times daily       MELATONIN PO Take 5-10 mg by mouth          Results for LUCIEN CRYSTAL (MRN [id number]) as of 2018 10:59   Ref. Range 7/10/2017 11:49   Keppra (Levetiracetam) Level Latest Ref Range: 6.0 - 46.0 ug/mL 56.1 (A)   Lamotrigine Level Latest Ref Range: 3.0 - 15.0 ug/mL 15.9 (A)   Valproic Acid Free Latest Ref Range: 6.0 - 20.0 ug/mL 8.7   Valproic Acid Total Latest Ref Range: 50.0 - 100.0 ug/mL 74.9 " "    Review of Systems:  Lethargy / Tiredness:  Yes, he takes naps during the day.   Nausea / Vomiting:  No  Double Vision:  rarely  Sleepiness:  Yes  Depression:  Yes  Memory Problems:  Yes  Poor Balance:  Yes, getting better  Dizziness: somewhat  Appetite Changes: a little  Blurred Vision: sometimes  Sleep Problems: sometimes  Behavioral Changes:  No  Skin: negative  Respiratory: No shortness of breath, or cough  Cardiovascular: negative  Have you experienced a traumatic fall since your last visit: NO    Other Issues:    Is patient safe to drive:  No.    Exam:  /87 (BP Location: Right arm, Patient Position: Chair, Cuff Size: Adult Regular)  Pulse 69  Temp 97.9  F (36.6  C)  Resp 16  Ht 5' 10.67\" (179.5 cm)  Wt 214 lb (97.1 kg)  BMI 30.13 kg/m2    Wt Readings from Last 5 Encounters:   08/10/17 197 lb 12.8 oz (89.7 kg)   05/30/17 188 lb 3.2 oz (85.4 kg)   05/15/17 192 lb 9.6 oz (87.4 kg)     General Appearance: Alert, awake, NAD  LANGUAGE/SPEECH:  No aphasia or dysarthria.   Extraocular movements are intact.  No nystagmus.    Face is symmetric.    Hearing is intact to voice.   MOTOR:  Normal tone, bulk and strength 5/5 bilaterally with no drift.    COORDINATION: Slight tremor in finger-to-nose bilaterally.   GAIT:  Steady.  He has trouble performing tandem gait.      Assessment and Plan:    1) Primary generalized epilepsy: The patient had a GTC seizure end of November and probable absence seizures once or twice a month.  He is taking  mg bid, LVT 1500 mg bid and  mg bid.  Kappra level was supratherapeutic, he feels depressed, LMT level is good.  I discussed increasing Depakote to 1000 mg bid.  He agrees.    - Increase Depakote to 1000 mg bid     - Continue other AEDs as before    - RTC in 6 months      As described above, I met with the patient for 30 minutes and during this time counseling was greater than 50% of the visit time.  Leigh Ann Fong MD                  "

## 2018-02-05 NOTE — MR AVS SNAPSHOT
After Visit Summary   2018    Johnson Crystal    MRN: [id number]           Patient Information     Date Of Birth          1970        Visit Information        Provider Department      2018 10:30 AM Leigh Ann Fong MD [facility]        Today's Diagnoses     Seizure disorder (H)           Follow-ups after your visit        Follow-up notes from your care team     Return in about 6 months (around 2018).      Your next 10 appointments already scheduled     Aug 09, 2018 10:00 AM CDT   Return Visit with MD JUAN JOSÉ [facility] ([facility])    [address], [address]  [facility] [id number] [phone]              Who to contact     Please call your clinic at [phone] to:    Ask questions about your health    Make or cancel appointments    Discuss your medicines    Learn about your test results    Speak to your doctor   If you have compliments or concerns about an experience at your clinic, or if you wish to file a complaint, please contact [facility] Patient Relations at [phone] or email us at [email or web address]         Additional Information About Your Visit        MyChart Information     [employer] is an electronic gateway that provides easy, online access to your medical records. With [employer], you can request a clinic appointment, read your test results, renew a prescription or communicate with your care team.     To sign up for [employer] visit the website at [email or web address]   You will be asked to enter the access code listed below, as well as some personal information. Please follow the directions to create your username and password.     Your access code is: [email or web address]  Expires: 2018 11:20 AM     Your access code will  in 90 days. If you need help or a new code, please contact your [facility] or call [phone] for  "assistance.        Care EveryWhere ID     This is your Care EveryWhere ID. This could be used by other organizations to access your [place] medical records  [id number]        Your Vitals Were     Pulse Temperature Respirations Height BMI (Body Mass Index)       69 97.9  F (36.6  C) 16 5' 10.67\" (179.5 cm) 30.13 kg/m2        Blood Pressure from Last 3 Encounters:   02/05/18 156/87   08/10/17 131/71   05/30/17 130/90    Weight from Last 3 Encounters:   02/05/18 214 lb (97.1 kg)   08/10/17 197 lb 12.8 oz (89.7 kg)   05/30/17 188 lb 3.2 oz (85.4 kg)              Today, you had the following     No orders found for display         Today's Medication Changes          These changes are accurate as of 2/5/18 11:20 AM.  If you have any questions, ask your nurse or doctor.               These medicines have changed or have updated prescriptions.        Dose/Directions    divalproex sodium extended-release 500 MG 24 hr tablet   Commonly known as:  DEPAKOTE ER   This may have changed:    - additional instructions  - Another medication with the same name was removed. Continue taking this medication, and follow the directions you see here.   Used for:  Seizure disorder (H)   Changed by:  Leigh Ann Fong MD        Take 2 tablets (1000 mg) twice daily   Quantity:  360 tablet   Refills:  1       lamoTRIgine 200 MG tablet   Commonly known as:  LaMICtal   This may have changed:  Another medication with the same name was removed. Continue taking this medication, and follow the directions you see here.   Used for:  Seizure disorder (H)   Changed by:  Leigh Ann Fong MD        Dose:  200 mg   Take 1 tablet (200 mg) by mouth 2 times daily   Quantity:  180 tablet   Refills:  1       levETIRAcetam 750 MG tablet   Commonly known as:  KEPPRA   This may have changed:  Another medication with the same name was removed. Continue taking this medication, and follow the directions you see here.   Used for:  Seizure disorder (H) "   Changed by:  Leigh Ann Fong MD        Dose:  1500 mg   Take 2 tablets (1,500 mg) by mouth 2 times daily   Quantity:  360 tablet   Refills:  1            Where to get your medicines      These medications were sent to [facility] - [place], [place] - [address]  [address] [address]     Phone:  [phone]     divalproex sodium extended-release 500 MG 24 hr tablet    lamoTRIgine 200 MG tablet    levETIRAcetam 750 MG tablet                Primary Care Provider Office Phone # Fax #    Earl Covarrubias [phone]       [facility] [address] [facility] [address]        Equal Access to Services     CECELIA BOWEN : Anders trevinoo Jamari, waaxda luqadaha, qaybta kaalmada adeloydyamaureen, gillian cadet. So [facility] [phone].    ATENCIÓN: Si habla español, tiene a sanon disposición servicios gratuitos de asistencia lingüística. [facility] [phone].    We comply with applicable federal civil rights laws and [place] laws. We do not discriminate on the basis of race, color, national origin, age, disability, sex, sexual orientation, or gender identity.            Thank you!     Thank you for choosing [facility] EPILEPSY [facility]  for your care. Our goal is always to provide you with excellent care. Hearing back from our patients is one way we can continue to improve our services. Please take a few minutes to complete the written survey that you may receive in the mail after your visit with us. Thank you!             Your Updated Medication List - Protect others around you: Learn how to safely use, store and throw away your medicines at www.disposemymeds.org.          This list is accurate as of 2/5/18 11:20 AM.  Always use your most recent med list.                   Brand Name Dispense Instructions for use Diagnosis    divalproex sodium extended-release 500 MG 24 hr tablet    DEPAKOTE ER    360 tablet    Take 2 tablets (1000  mg) twice daily    Seizure disorder (H)       lamoTRIgine 200 MG tablet    LaMICtal    180 tablet    Take 1 tablet (200 mg) by mouth 2 times daily    Seizure disorder (H)       levETIRAcetam 750 MG tablet    KEPPRA    360 tablet    Take 2 tablets (1,500 mg) by mouth 2 times daily    Seizure disorder (H)       MELATONIN PO      Take 5-10 mg by mouth

## 2018-07-27 DIAGNOSIS — G40.909 SEIZURE DISORDER (H): ICD-10-CM

## 2018-07-30 ENCOUNTER — TRANSFERRED RECORDS (OUTPATIENT)
Dept: HEALTH INFORMATION MANAGEMENT | Facility: CLINIC | Age: 48
End: 2018-07-30

## 2018-07-30 RX ORDER — DIVALPROEX SODIUM 500 MG/1
TABLET, EXTENDED RELEASE ORAL
Qty: 120 TABLET | Refills: 0 | Status: SHIPPED | OUTPATIENT
Start: 2018-07-30 | End: 2018-08-30

## 2018-08-10 DIAGNOSIS — G40.909 SEIZURE DISORDER (H): ICD-10-CM

## 2018-08-13 RX ORDER — LEVETIRACETAM 750 MG/1
TABLET ORAL
Qty: 120 TABLET | Refills: 0 | Status: SHIPPED | OUTPATIENT
Start: 2018-08-13 | End: 2018-08-30

## 2018-08-25 DIAGNOSIS — G40.909 SEIZURE DISORDER (H): ICD-10-CM

## 2018-08-25 RX ORDER — LAMOTRIGINE 200 MG/1
TABLET ORAL
Qty: 60 TABLET | Refills: 0 | Status: CANCELLED | OUTPATIENT
Start: 2018-08-25

## 2018-08-30 ENCOUNTER — OFFICE VISIT (OUTPATIENT)
Dept: NEUROLOGY | Facility: CLINIC | Age: 48
End: 2018-08-30
Payer: COMMERCIAL

## 2018-08-30 VITALS
TEMPERATURE: 98.3 F | HEART RATE: 68 BPM | SYSTOLIC BLOOD PRESSURE: 143 MMHG | DIASTOLIC BLOOD PRESSURE: 87 MMHG | WEIGHT: 210 LBS | BODY MASS INDEX: 29.56 KG/M2

## 2018-08-30 DIAGNOSIS — G40.909 SEIZURE DISORDER (H): ICD-10-CM

## 2018-08-30 RX ORDER — PRIMIDONE 50 MG/1
TABLET ORAL
Qty: 45 TABLET | Refills: 4 | Status: SHIPPED | OUTPATIENT
Start: 2018-08-30 | End: 2018-12-19

## 2018-08-30 RX ORDER — DIVALPROEX SODIUM 500 MG/1
TABLET, EXTENDED RELEASE ORAL
Qty: 270 TABLET | Refills: 3 | Status: SHIPPED | OUTPATIENT
Start: 2018-08-30 | End: 2019-01-07

## 2018-08-30 RX ORDER — LEVETIRACETAM 750 MG/1
1500 TABLET ORAL 2 TIMES DAILY
Qty: 120 TABLET | Refills: 3 | Status: SHIPPED | OUTPATIENT
Start: 2018-08-30 | End: 2019-01-07

## 2018-08-30 RX ORDER — LAMOTRIGINE 200 MG/1
200 TABLET ORAL 2 TIMES DAILY
Qty: 180 TABLET | Refills: 3 | Status: SHIPPED | OUTPATIENT
Start: 2018-08-30 | End: 2019-01-07

## 2018-08-30 ASSESSMENT — PAIN SCALES - GENERAL: PAINLEVEL: NO PAIN (0)

## 2018-08-30 NOTE — LETTER
"2018       RE: Lucien Crystal  : 1970   MRN: [id number]      Dear Colleague,    Thank you for referring your patient, Lucien Crystal, to the [facility] EPILEPSY CARE at [facility]. Please see a copy of my visit note below.    [facility] Epilepsy Care Progress Note      Patient:  Lucien Crystal  :  1970   Age:  48 year old   Today's Office Visit:  2018      History of Present Illness:     The patient is here for a follow up on his seizures.  He had no GTC seizures since last visit.  In the last visit we increased his Depakote. He had \"smaller seizures' like a daydream, probably staring spells.  He  usually notices after the seizure is over.  Sometimes he can't tell if he had a seizure, so he is not sure how often they happen or how many he had since last visit.   He has a lot of tremors and can't write well any more, also has double vision and memory problems.  He is taking Depakote, lamotrigine, and levetiracetam.    Current Outpatient Prescriptions   Medication Sig Dispense Refill     divalproex sodium extended-release (DEPAKOTE ER) 500 MG 24 hr tablet TAKE (2) TABLETS TWICE DAILY. 120 tablet 0     lamoTRIgine (LAMICTAL) 200 MG tablet Take 1 tablet (200 mg) by mouth 2 times daily 180 tablet 1     levETIRAcetam (KEPPRA) 750 MG tablet TAKE (2) TABLETS TWICE DAILY. 120 tablet 0     MELATONIN PO Take 5-10 mg by mouth        Results for LUCIEN CRYSTAL (MRN [id number]) as of 2018 12:03   Ref. Range 7/10/2017 11:49   Keppra (Levetiracetam) Level Latest Ref Range: 6.0 - 46.0 ug/mL 56.1 (A)   Lamotrigine Level Latest Ref Range: 3.0 - 15.0 ug/mL 15.9 (A)   Valproic Acid Free Latest Ref Range: 6.0 - 20.0 ug/mL 8.7   Valproic Acid Total Latest Ref Range: 50.0 - 100.0 ug/mL 74.9     Medication Notes:       AED Medication Compliance:  compliant most of the time  Using a pill box:  No    Review of Systems:  Lethargy / Tiredness:  No  Nausea / Vomiting:  No  Double " Vision:  Yes  Sleepiness:  Yes  Depression:  No  Slowed Cognitive Function:  Yes  Memory Problems:  Yes  Poor Balance:  Yes  Dizziness:  No  Appetite Changes:  No  Blurred Vision:  Yes  Sleep Changes:  Yes  Behavioral Changes:  No  Skin: negative  Respiratory: Shortness of breath- sometimes  Cardiovascular: negative  Have you experienced a traumatic fall since your last visit: NO      Other Issues:    Is patient safe to drive:  No    Exam:    /87 (BP Location: Left arm, Patient Position: Chair, Cuff Size: Adult Regular)  Pulse 68  Temp 98.3  F (36.8  C)  Wt 210 lb (95.3 kg)  BMI 29.56 kg/m2     Wt Readings from Last 5 Encounters:   08/30/18 210 lb (95.3 kg)   02/05/18 214 lb (97.1 kg)   08/10/17 197 lb 12.8 oz (89.7 kg)   05/30/17 188 lb 3.2 oz (85.4 kg)   05/15/17 192 lb 9.6 oz (87.4 kg)     General Appearance: Alert, awake, NAD  LANGUAGE/SPEECH: at times has difficulty finding words and expressing his ideas.  Extraocular movements are intact.    Face is symmetric.    Hearing is intact to voice.   MOTOR:  no foal weakness  COORDINATION: Slight tremors in outstretched arms bilaterally  GAIT:  Steady.      Assessment and Plan:     1) Primary generalized epilepsy: no GTC seizures.  He thinks he may have smaller seizures, calls them as daydreaming, can't specify the frequency.  I suggested doing an EEG to see if he has frequent interictal epileptiform discharges or seizures.      2) Significant tremors:interfering with his writing and eating, has worsened recently, not sure when that started.     - 3 hr vEEG at [facility]  - Decrease Depakote to 500-1000  - Continue other AEDs as before  - obtain AED levels in 1-2 weeks  - Start Primidone 50 mg daily for 3 days, then increase by 50 mg every 3 days to 50 mg tid.  - RTC in 4 months        As described above, I met with the patient for 30 minutes and during this time counseling was greater than 50% of the visit time.  Leigh Ann Fong  MD                    Again, thank you for allowing me to participate in the care of your patient.      Sincerely,    Leigh Ann Fong MD

## 2018-08-30 NOTE — MR AVS SNAPSHOT
After Visit Summary   8/30/2018    Jhonson Crystal    MRN: [id number]           Patient Information     Date Of Birth          1970        Visit Information        Provider Department      8/30/2018 11:30 AM Leigh Ann Fong MD [facility]        Today's Diagnoses     Seizure disorder (H)          Care Instructions      Times of Days  am  pm  hs     Medication Tablet Size Number of Tablets/Capsules Total Daily Dosage    Depakote   500 mg  1    2                                              Primidone   50 mg   1             day 4    1        1      day 8    1    1    1                                           Carry this with you at all times.  CONTINUE TAKING YOUR OTHER MEDICATIONS AS PREVIOUSLY DIRECTED.      * * *Do not store medications in the bathroom.  Keep medications away from children!* * *             Follow-ups after your visit        Follow-up notes from your care team     Return in about 4 months (around 12/30/2018).      Your next 10 appointments already scheduled     Jan 07, 2019 12:30 PM CST   EEG with [facility] EEG 2   [facility] ([facility])    [address], [address]  [facility] [id number] [phone]            Jan 07, 2019  3:30 PM CST   Return Visit with Leigh Ann Fong MD   [facility] ([facility])    [address], [address]  [facility] [id number] [phone]              Future tests that were ordered for you today     Open Future Orders        Priority Expected Expires Ordered    ORDER:  EEG video monitoring Routine 12/31/2018 4/28/2019 8/30/2018            Who to contact     Please call your clinic at [phone] to:    Ask questions about your health    Make or cancel appointments    Discuss your medicines    Learn about your test results    Speak to your doctor            Additional Information About Your Visit        Care EveryWhere ID     This is your Care EveryWhere ID. This  could be used by other organizations to access your [place] medical records  [id number]        Your Vitals Were     Pulse Temperature BMI (Body Mass Index)             68 98.3  F (36.8  C) 29.56 kg/m2          Blood Pressure from Last 3 Encounters:   08/30/18 143/87   02/05/18 156/87   08/10/17 131/71    Weight from Last 3 Encounters:   08/30/18 210 lb (95.3 kg)   02/05/18 214 lb (97.1 kg)   08/10/17 197 lb 12.8 oz (89.7 kg)                 Today's Medication Changes          These changes are accurate as of 8/30/18 12:28 PM.  If you have any questions, ask your nurse or doctor.               Start taking these medicines.        Dose/Directions    primidone 50 MG tablet   Commonly known as:  MYSOLINE   Used for:  Seizure disorder (H)   Started by:  Leigh Ann Fong MD        Take 1 tab daily for 3 days, then 1 tab two times a day for 3 days then increase to 1 tab 3 times a day.   Quantity:  45 tablet   Refills:  4         These medicines have changed or have updated prescriptions.        Dose/Directions    divalproex sodium extended-release 500 MG 24 hr tablet   Commonly known as:  DEPAKOTE ER   This may have changed:  See the new instructions.   Used for:  Seizure disorder (H)   Changed by:  Leigh Ann Fong MD        Take 1 tab in the morning and 2 tabs in the evening   Quantity:  270 tablet   Refills:  3       levETIRAcetam 750 MG tablet   Commonly known as:  KEPPRA   This may have changed:  See the new instructions.   Used for:  Seizure disorder (H)   Changed by:  Leigh Ann Fong MD        Dose:  1500 mg   Take 2 tablets (1,500 mg) by mouth 2 times daily   Quantity:  120 tablet   Refills:  3            Where to get your medicines      These medications were sent to [facility] - [place], [place] - [address] [place] [address]     Phone:  [phone]     divalproex sodium extended-release 500 MG 24 hr tablet    lamoTRIgine 200 MG tablet     levETIRAcetam 750 MG tablet    primidone 50 MG tablet                Primary Care Provider Office Phone # Fax #    Earl Covarrubias [phone]       [address] [address]        Equal Access to Services     CECELIA BOWEN : Hadii yesica ortiz belindao Somaeali, waaxda luqadaha, qaybta kaalmada adeegyada, waxsuleman ana bethaniethony barrientos marlamelissa cadet. So [facility] [phone].    ATENCIÓN: Si habla español, tiene a sanon disposición servicios gratuitos de asistencia lingüística. Diamond al [phone].    We comply with applicable federal civil rights laws and [place] laws. We do not discriminate on the basis of race, color, national origin, age, disability, sex, sexual orientation, or gender identity.            Thank you!     Thank you for choosing [facility]  for your care. Our goal is always to provide you with excellent care. Hearing back from our patients is one way we can continue to improve our services. Please take a few minutes to complete the written survey that you may receive in the mail after your visit with us. Thank you!             Your Updated Medication List - Protect others around you: Learn how to safely use, store and throw away your medicines at www.disposemymeds.org.          This list is accurate as of 8/30/18 12:28 PM.  Always use your most recent med list.                   Brand Name Dispense Instructions for use Diagnosis    divalproex sodium extended-release 500 MG 24 hr tablet    DEPAKOTE ER    270 tablet    Take 1 tab in the morning and 2 tabs in the evening    Seizure disorder (H)       lamoTRIgine 200 MG tablet    LaMICtal    180 tablet    Take 1 tablet (200 mg) by mouth 2 times daily    Seizure disorder (H)       levETIRAcetam 750 MG tablet    KEPPRA    120 tablet    Take 2 tablets (1,500 mg) by mouth 2 times daily    Seizure disorder (H)       MELATONIN PO      Take 5-10 mg by mouth        primidone 50 MG tablet    MYSOLINE    45 tablet     Take 1 tab daily for 3 days, then 1 tab two times a day for 3 days then increase to 1 tab 3 times a day.    Seizure disorder (H)

## 2018-08-30 NOTE — PROGRESS NOTES
"[facility] [place] [place] [facility]      Patient:  Lucien Crystal  :  1970   Age:  48 year old   Today's Office Visit:  2018      History of Present Illness:     The patient is here for a follow up on his seizures.  He had no GTC seizures since last visit.  In the last visit we increased his Depakote. He had \"smaller seizures' like a daydream, probably staring spells.  He usually notices after the seizure is over.  Sometimes he can't tell if he had a seizure, so he is not sure how often they happen or how many he had since last visit.   He has a lot of tremors and can't write well any more, also has double vision and memory problems.  He is taking Depakote, lamotrigine, and levetiracetam.    Current Outpatient Prescriptions   Medication Sig Dispense Refill     divalproex sodium extended-release (DEPAKOTE ER) 500 MG 24 hr tablet TAKE (2) TABLETS TWICE DAILY. 120 tablet 0     lamoTRIgine (LAMICTAL) 200 MG tablet Take 1 tablet (200 mg) by mouth 2 times daily 180 tablet 1     levETIRAcetam (KEPPRA) 750 MG tablet TAKE (2) TABLETS TWICE DAILY. 120 tablet 0     MELATONIN PO Take 5-10 mg by mouth        Results for LUCIEN CRYSTAL (MRN [id number]) as of 2018 12:03   Ref. Range 7/10/2017 11:49   Keppra (Levetiracetam) Level Latest Ref Range: 6.0 - 46.0 ug/mL 56.1 (A)   Lamotrigine Level Latest Ref Range: 3.0 - 15.0 ug/mL 15.9 (A)   Valproic Acid Free Latest Ref Range: 6.0 - 20.0 ug/mL 8.7   Valproic Acid Total Latest Ref Range: 50.0 - 100.0 ug/mL 74.9     Medication Notes:       AED Medication Compliance:  compliant most of the time  Using a pill box:  No    Review of Systems:  Lethargy / Tiredness:  No  Nausea / Vomiting:  No  Double Vision:  Yes  Sleepiness:  Yes  Depression:  No  Slowed Cognitive Function:  Yes  Memory Problems:  Yes  Poor Balance:  Yes  Dizziness:  No  Appetite Changes:  No  Blurred Vision:  Yes  Sleep Changes:  Yes  Behavioral Changes:  No  Skin: negative  Respiratory: Shortness of " breath- sometimes  Cardiovascular: negative  Have you experienced a traumatic fall since your last visit: NO      Other Issues:    Is patient safe to drive:  No    Exam:    /87 (BP Location: Left arm, Patient Position: Chair, Cuff Size: Adult Regular)  Pulse 68  Temp 98.3  F (36.8  C)  Wt 210 lb (95.3 kg)  BMI 29.56 kg/m2     Wt Readings from Last 5 Encounters:   08/30/18 210 lb (95.3 kg)   02/05/18 214 lb (97.1 kg)   08/10/17 197 lb 12.8 oz (89.7 kg)   05/30/17 188 lb 3.2 oz (85.4 kg)   05/15/17 192 lb 9.6 oz (87.4 kg)     General Appearance: Alert, awake, NAD  LANGUAGE/SPEECH: at times has difficulty finding words and expressing his ideas.  Extraocular movements are intact.    Face is symmetric.    Hearing is intact to voice.   MOTOR:  no foal weakness  COORDINATION: Slight tremors in outstretched arms bilaterally  GAIT:  Steady.      Assessment and Plan:     1) Primary generalized epilepsy: no GTC seizures.  He thinks he may have smaller seizures, calls them as daydreaming, can't specify the frequency.  I suggested doing an EEG to see if he has frequent interictal epileptiform discharges or seizures.      2) Significant tremors:interfering with his writing and eating, has worsened recently, not sure when that started.     - 3 hr [facility]EEG at [facility]  - Decrease Depakote to 500-1000  - Continue other AEDs as before  - obtain AED levels in 1-2 weeks  - Start Primidone 50 mg daily for 3 days, then increase by 50 mg every 3 days to 50 mg tid.  - RTC in 4 months        As described above, I met with the patient for 30 minutes and during this time counseling was greater than 50% of the visit time.  Leigh Ann Fong MD
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
## 2018-12-19 DIAGNOSIS — G40.909 SEIZURE DISORDER (H): ICD-10-CM

## 2018-12-20 RX ORDER — PRIMIDONE 50 MG/1
TABLET ORAL
Qty: 90 TABLET | Refills: 0 | Status: SHIPPED | OUTPATIENT
Start: 2018-12-20 | End: 2019-01-07

## 2019-01-07 ENCOUNTER — OFFICE VISIT (OUTPATIENT)
Dept: NEUROLOGY | Facility: CLINIC | Age: 49
End: 2019-01-07
Payer: COMMERCIAL

## 2019-01-07 ENCOUNTER — ALLIED HEALTH/NURSE VISIT (OUTPATIENT)
Dept: NEUROLOGY | Facility: CLINIC | Age: 49
End: 2019-01-07
Payer: COMMERCIAL

## 2019-01-07 VITALS
WEIGHT: 224 LBS | HEART RATE: 62 BPM | DIASTOLIC BLOOD PRESSURE: 90 MMHG | BODY MASS INDEX: 31.54 KG/M2 | TEMPERATURE: 97.7 F | SYSTOLIC BLOOD PRESSURE: 141 MMHG

## 2019-01-07 DIAGNOSIS — G40.909 SEIZURE DISORDER (H): Primary | ICD-10-CM

## 2019-01-07 RX ORDER — PRIMIDONE 50 MG/1
TABLET ORAL
Qty: 225 TABLET | Refills: 3 | Status: SHIPPED | OUTPATIENT
Start: 2019-01-07 | End: 2020-01-16

## 2019-01-07 RX ORDER — LAMOTRIGINE 200 MG/1
200 TABLET ORAL 2 TIMES DAILY
Qty: 180 TABLET | Refills: 3 | Status: SHIPPED | OUTPATIENT
Start: 2019-01-07 | End: 2019-12-26

## 2019-01-07 RX ORDER — LEVETIRACETAM 750 MG/1
1500 TABLET ORAL 2 TIMES DAILY
Qty: 360 TABLET | Refills: 3 | Status: SHIPPED | OUTPATIENT
Start: 2019-01-07 | End: 2020-01-16

## 2019-01-07 RX ORDER — PRIMIDONE 50 MG/1
TABLET ORAL
Qty: 90 TABLET | Refills: 3 | Status: SHIPPED | OUTPATIENT
Start: 2019-01-07 | End: 2019-01-07

## 2019-01-07 RX ORDER — DIVALPROEX SODIUM 500 MG/1
TABLET, EXTENDED RELEASE ORAL
Qty: 270 TABLET | Refills: 3 | Status: SHIPPED | OUTPATIENT
Start: 2019-01-07 | End: 2020-01-16

## 2019-01-07 ASSESSMENT — PAIN SCALES - GENERAL: PAINLEVEL: NO PAIN (0)

## 2019-01-07 NOTE — LETTER
2019       RE: Johnson Crystal  : 1970   MRN: [id number]      To whom it may concern,    Mr. Johnson Crystal has been under my care for his epilepsy since May 2017.  He is taking levetiracetam, Depakote and lamotrigine. Seizures are fairly controlled for the most part. He may return to work with restrictions of not driving, not working with heavy equipment or firearms, going on heights and ladder for 3 months after any seizure with loss of consciousness.   Please contact me at [phone] if you had any questions or concerns.       Sincerely,  Leigh Ann Fong MD

## 2019-01-07 NOTE — PROGRESS NOTES
CPT 06645-53  OP/3hr Video EEG  [facility]

## 2019-01-07 NOTE — PROGRESS NOTES
[facility]      Patient:  Lucien Crystal  :  1970   Age:  48 year old   Today's Office Visit:  2019      History of Present Illness:     Lucien is here for a follow up on his seizures. He had some episodes which he feels he might have had a lapse in time briefly. I.e in [place], he got up to give his brother a hug and before this, he felt somewhat unusual and felt he might have had a brief loss of awareness. He also had a seizure which he was taken to ER, but doesn't recall when has  happened. He says he stared and then fell backward and had a GTC seizure. There is no records in the chart. He is not sure if that happened since last visit or before that.     He is taking Depakote 500-1000 and lamotrigine 200 mg bid, and LVT 1500 mg bid. He was also started on primidone last visit. He is taking 50 mg three times a day.       Current Outpatient Medications   Medication Sig Dispense Refill     divalproex sodium extended-release (DEPAKOTE ER) 500 MG 24 hr tablet Take 1 tab in the morning and 2 tabs in the evening 270 tablet 3     lamoTRIgine (LAMICTAL) 200 MG tablet Take 1 tablet (200 mg) by mouth 2 times daily 180 tablet 3     levETIRAcetam (KEPPRA) 750 MG tablet Take 2 tablets (1,500 mg) by mouth 2 times daily 120 tablet 3     MELATONIN PO Take 5-10 mg by mouth       primidone (MYSOLINE) 50 MG tablet TAKE (1) TABLET DAILY X 3 DAYS, THEN (1) TWO TIMES DAILY X3 DAYS, THEN (1) THREE TIMES DAILY. 90 tablet 0      Results for LUCIEN CRYSTAL (MRN [id number]) as of 2019 15:55   Ref. Range 7/10/2017 11:49   Keppra (Levetiracetam) Level Latest Ref Range: 6.0 - 46.0 ug/mL 56.1 (A)   Lamotrigine Level Latest Ref Range: 3.0 - 15.0 ug/mL 15.9 (A)   Valproic Acid Free Latest Ref Range: 6.0 - 20.0 ug/mL 8.7   Valproic Acid Total Latest Ref Range: 50.0 - 100.0 ug/mL 74.9     Medication Notes:       Using a pill box:  No    Review of Systems:  Lethargy / Tiredness:   Yes  Nausea / Vomiting:  No  Double Vision:  Yes  Sleepiness:  Yes  Depression:  Yes  Memory Problems:  No  Poor Balance:  No  Dizziness:  No  Appetite Changes:  Yes, sometimes has decreased appetite  Blurred Vision:  Yes  Sleep Changes:  Yes  Behavioral Changes:  No  Skin: negative  Respiratory: Shortness of breath and Cough  Cardiovascular: negative    Exam:    /90 (BP Location: Left arm, Patient Position: Chair, Cuff Size: Adult Regular)   Pulse 62   Temp 97.7  F (36.5  C)   Wt 224 lb (101.6 kg)   BMI 31.54 kg/m       Wt Readings from Last 5 Encounters:   01/07/19 224 lb (101.6 kg)   08/30/18 210 lb (95.3 kg)   02/05/18 214 lb (97.1 kg)   08/10/17 197 lb 12.8 oz (89.7 kg)   05/30/17 188 lb 3.2 oz (85.4 kg)     General Appearance: Alert, awake, NAD  LANGUAGE/SPEECH: at times has difficulty finding words and expressing his ideas.  CNs: Extraocular movements are intact. Face is symmetric.  Hearing is intact to voice.   MOTOR:  no foal weakness  COORDINATION: Slight tremors in outstretched arms bilaterally  GAIT:  Steady.      Assessment and Plan:     1) Primary generalized epilepsy: he had a few episodes with brief loss of consciousness. There is no ER visit for seizures since last visit in the chart or care giver. He is not sure if the GTC seizure with fall happened before or after the last visit.  He is taking levetiracetam, Depakote and lamotrigine.     2) Significant tremors: taking primidone which helps somewhat.     - Continue  Depakote 500-1000 and lamotrigine 200 mg bid, and LVT 1500 mg bid.    - Continue Primidone 50 mg tid    - RTC in 6 months      As described above, I met with the patient for 25 minutes and during this time counseling was greater than 50% of the visit time.  Leigh Ann Fong MD

## 2019-01-07 NOTE — LETTER
2019       RE: Lucien Crystal  : 1970   MRN: [id number]      Dear Colleague,    Thank you for referring your patient, Lucien Crystal, to the [facility] EPILEPSY CARE at [facility]. Please see a copy of my visit note below.    [facility] Epilepsy Care Progress Note      Patient:  Lucien Crystal  :  1970   Age:  48 year old   Today's Office Visit:  2019      History of Present Illness:     Lucien is here for a follow up on his seizures. He had some episodes which he feels he might have had a lapse in time briefly. [place]e in [place], he got up to give his brother a hug and before this, he felt somewhat unusual and felt he might have had a brief loss of awareness. He also had a seizure which he was taken to ER, but doesn't recall when has  happened. He says he stared and then fell backward and had a GTC seizure. There is no records in the chart. He is not sure if that happened since last visit or before that.     He is taking Depakote 500-1000 and lamotrigine 200 mg bid, and LVT 1500 mg bid. He was also started on primidone last visit. He is taking 50 mg three times a day.       Current Outpatient Medications   Medication Sig Dispense Refill     divalproex sodium extended-release (DEPAKOTE ER) 500 MG 24 hr tablet Take 1 tab in the morning and 2 tabs in the evening 270 tablet 3     lamoTRIgine (LAMICTAL) 200 MG tablet Take 1 tablet (200 mg) by mouth 2 times daily 180 tablet 3     levETIRAcetam (KEPPRA) 750 MG tablet Take 2 tablets (1,500 mg) by mouth 2 times daily 120 tablet 3     MELATONIN PO Take 5-10 mg by mouth       primidone (MYSOLINE) 50 MG tablet TAKE (1) TABLET DAILY X 3 DAYS, THEN (1) TWO TIMES DAILY X3 DAYS, THEN (1) THREE TIMES DAILY. 90 tablet 0      Results for LUCIEN CRYSTAL (MRN [id number]) as of 2019 15:55   Ref. Range 7/10/2017 11:49   Keppra (Levetiracetam) Level Latest Ref Range: 6.0 - 46.0 ug/mL 56.1 (A)   Lamotrigine Level  Latest Ref Range: 3.0 - 15.0 ug/mL 15.9 (A)   Valproic Acid Free Latest Ref Range: 6.0 - 20.0 ug/mL 8.7   Valproic Acid Total Latest Ref Range: 50.0 - 100.0 ug/mL 74.9     Medication Notes:       Using a pill box:  No    Review of Systems:  Lethargy / Tiredness:  Yes  Nausea / Vomiting:  No  Double Vision:  Yes  Sleepiness:  Yes  Depression:  Yes  Memory Problems:  No  Poor Balance:  No  Dizziness:  No  Appetite Changes:  Yes, sometimes has decreased appetite  Blurred Vision:  Yes  Sleep Changes:  Yes  Behavioral Changes:  No  Skin: negative  Respiratory: Shortness of breath and Cough  Cardiovascular: negative    Exam:    /90 (BP Location: Left arm, Patient Position: Chair, Cuff Size: Adult Regular)   Pulse 62   Temp 97.7  F (36.5  C)   Wt 224 lb (101.6 kg)   BMI 31.54 kg/m        Wt Readings from Last 5 Encounters:   01/07/19 224 lb (101.6 kg)   08/30/18 210 lb (95.3 kg)   02/05/18 214 lb (97.1 kg)   08/10/17 197 lb 12.8 oz (89.7 kg)   05/30/17 188 lb 3.2 oz (85.4 kg)     General Appearance: Alert, awake, NAD  LANGUAGE/SPEECH: at times has difficulty finding words and expressing his ideas.  CNs: Extraocular movements are intact. Face is symmetric.  Hearing is intact to voice.   MOTOR:  no foal weakness  COORDINATION: Slight tremors in outstretched arms bilaterally  GAIT:  Steady.      Assessment and Plan:     1) Primary generalized epilepsy:  he had a few episodes with brief loss of consciousness. There is no ER visit for seizures since last visit in the chart or care giver. He is not sure if the GTC seizure with fall happened before or after the last visit.  He is taking levetiracetam, Depakote and lamotrigine.     2) Significant tremors: taking primidone which helps somewhat.     - Continue  Depakote 500-1000 and lamotrigine 200 mg bid, and LVT 1500 mg bid.    - Continue Primidone 50 mg tid    - RTC in 6 months      As described above, I met with the patient for 25 minutes and during this time  counseling was greater than 50% of the visit time.  Leigh Ann Fong MD                    Again, thank you for allowing me to participate in the care of your patient.      Sincerely,    Leigh Ann Fong MD

## 2019-01-08 NOTE — PROCEDURES
Procedure Date: 2019      EEG #:  [id number].      DATE OF RECORDIN2019.      SOURCE FILE DURATION:  2 hours, 7 minutes, 21 seconds.      CLINICAL SUMMARY:  The patient is a 48-year-old male with history of generalized epilepsy.  EEG was performed to evaluate for seizures.      MEDICATIONS: The patient is taking levetiracetam, lamotrigine and Depakote.     TECHNICAL SUMMARY: This continuous video- EEG monitoring procedure was performed with 23 scalp electrodes in 10-20 electrode system placement, and additional scalp, precordial and other surface electrodes used for electrical referencing and artifact detection.  Video monitoring was utilized and periodically reviewed by EEG technologists and the physician for electroclinical correlations.     INTERICTAL ACTIVITIES:  During quiet wakefulness, there was 8-9 Hz alpha activity over the posterior head regions which was symmetric and reactive.  Drowsiness was manifested as dropout of the posterior dominant rhythm and diffuse theta activity and horizontal eye movements.  Stage II sleep was manifested as vertex waves, symmetric sleep spindles and K complexes.  Rare generalized spike and wave discharges were present at 3 Hz frequency during sleep.  These were isolated discharges at 3 Hz  - Examples are at 14:48:18 and 14:50:03.      Photic stimulation and hyperventilation did not induce an abnormal activity on the EEG.      CLINICAL/ICTAL EVENTS:  No electrographic or clinical seizures were recorded.      IMPRESSION:  This is an abnormal video EEG due to the presence of rare generalized epileptiform discharges in a normal background during sleep, consistent with idiopathic generalized epilepsy.  No electrographic or clinical seizures were recorded.         BRENT PACK MD             D: 2019   T: 2019   MT: al      Name:     LUCIEN KING   MRN:      [id number]        Account:        [id number]   :      1970            Procedure Date: 01/07/2019      Document: [id number]

## 2019-01-10 ENCOUNTER — TELEPHONE (OUTPATIENT)
Dept: NEUROLOGY | Facility: CLINIC | Age: 49
End: 2019-01-10

## 2019-01-16 LAB
KEPPRA (LEVETIRACETAM) LEVEL: 21 UG/ML (ref 6–46)
LAMOTRIGINE SERPL-MCNC: 9.8 UG/ML (ref 3–15)
PRIMIDONE LEVEL: 2.4 UG/ML (ref 5–12)
VALPROIC ACID FREE: 8.6 (ref 6–20)
VALPROIC ACID TOTAL: 79 (ref 50–100)

## 2019-01-24 ENCOUNTER — TELEPHONE (OUTPATIENT)
Dept: NEUROLOGY | Facility: CLINIC | Age: 49
End: 2019-01-24

## 2019-01-25 NOTE — TELEPHONE ENCOUNTER
Patient called saying that he needed his work note updated to include primidone. I forwarded this message to Dr. Key and [facility] [place] [place] [facility] I gave him this number.    Phyllis Booth, DO on 1/25/2019 at 9:46 AM

## 2019-02-14 ENCOUNTER — TELEPHONE (OUTPATIENT)
Dept: NEUROLOGY | Facility: CLINIC | Age: 49
End: 2019-02-14

## 2019-02-14 NOTE — TELEPHONE ENCOUNTER
"Nurse received In-Basket message as follows:    nful   Received: Today   Message Contents   Yari Kellen sent to P Jayne Lambert Rn Pool             Caller: Johnson     Relationship to Patient: pt     Call Back Number: [phone]     Reason for Call: Pt would like to discuss his return to work letter.      Nurse returned call to patient who has vague questions about his RTW Letter sent 1/7/19. He is clear on one request that Primidone be included on this letter along with meds already listed.  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 Patient's concern seems to be that the light might bother him, and is asking if it is ok.  He has not returned to work yet, but indicates that he needs to soon.  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    Nurse indicated that he would speak to Dr. Key, and re-draft RTW letter, and let him know what Dr. Key has to say.    "

## 2019-07-15 ENCOUNTER — OFFICE VISIT (OUTPATIENT)
Dept: NEUROLOGY | Facility: CLINIC | Age: 49
End: 2019-07-15
Payer: COMMERCIAL

## 2019-07-15 VITALS
BODY MASS INDEX: 31.63 KG/M2 | DIASTOLIC BLOOD PRESSURE: 78 MMHG | WEIGHT: 224.7 LBS | HEART RATE: 64 BPM | SYSTOLIC BLOOD PRESSURE: 128 MMHG

## 2019-07-15 DIAGNOSIS — S09.90XD TRAUMATIC INJURY OF HEAD, SUBSEQUENT ENCOUNTER: Primary | ICD-10-CM

## 2019-07-15 ASSESSMENT — PAIN SCALES - GENERAL: PAINLEVEL: NO PAIN (0)

## 2019-07-15 NOTE — LETTER
7/15/2019       RE: Johnson Crystal  : 1970   MRN: [id number]      Dear Colleague,    Thank you for referring your patient, Johnson Crystal, to the [facility] EPILEPSY CARE at [facility]. Please see a copy of my visit note below.    [facility] Epilepsy Care Progress Note        Patient:  Jonhson Crystal  :  1970   Age:   49 year old   Today's Office Visit:  7/15/2019, last visit 2019         History of Present Illness:     Johnson is here for a follow up on his seizures.     He may have had two episodes in April of brief (<3 secs) loss of attention/altered awareness.  He recalls his last seizure with loss of consciousness being in 2018.  He is taking Depakote 500-1000 and lamotrigine 200 mg bid, and LVT 1500 mg bid. He was also started on primidone in , mainly for tremors. He is taking 50 mg three times a day.    He has previously described these episodes where he feels he might have had a lapse in time briefly. I.e in a party in [place], he got up to give his brother a hug and before this, he felt somewhat unusual and felt he might have had a brief loss of consciousness.    His tremor is somewhat better on primidone, but he still has them. He is taking 50mg TID usually but may occasionally forget to take this medication in the afternoon or sleep thru the dosing time.  This is happening ~ 1/week.    Of note, patient was not allowed to go back to work earlier this year despite RTW letter.  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  Patient had employment in [employer], may consider expanding his search area for new jobs.            Review of your medicines           Accurate as of 7/15/19 11:17 AM. If you have any questions, ask your nurse or doctor.               CONTINUE these medicines which have NOT CHANGED      Dose / Directions   divalproex sodium  extended-release 500 MG 24 hr tablet  Commonly known as:  DEPAKOTE ER  Used for:  Seizure disorder (H)      Take 1 tab in the morning and 2 tabs in the evening  Quantity:  270 tablet  Refills:  3     lamoTRIgine 200 MG tablet  Commonly known as:  LaMICtal  Used for:  Seizure disorder (H)      Dose:  200 mg  Take 1 tablet (200 mg) by mouth 2 times daily  Quantity:  180 tablet  Refills:  3     levETIRAcetam 750 MG tablet  Commonly known as:  KEPPRA  Used for:  Seizure disorder (H)      Dose:  1500 mg  Take 2 tablets (1,500 mg) by mouth 2 times daily  Quantity:  360 tablet  Refills:  3     MELATONIN PO      Dose:  5-10 mg  Take 5-10 mg by mouth  Refills:  0     primidone 50 MG tablet  Commonly known as:  MYSOLINE  Used for:  Seizure disorder (H)      TAKE 1/2 TAB IN THE MORNING AND 1 TAB IN THE EVENING AND 1 TAB AT BEDTIME  Quantity:  225 tablet  Refills:  3               Results for NATALIENITESHLUCIEN (MRN [id number]) as of 1/7/2019 15:55    Ref. Range 7/10/2017 11:49   Keppra (Levetiracetam) Level Latest Ref Range: 6.0 - 46.0 ug/mL 56.1 (A)   Lamotrigine Level Latest Ref Range: 3.0 - 15.0 ug/mL 15.9 (A)   Valproic Acid Free Latest Ref Range: 6.0 - 20.0 ug/mL 8.7   Valproic Acid Total Latest Ref Range: 50.0 - 100.0 ug/mL 74.9      Medication Notes:       Using a pill box:  No     Review of Systems:  Lethargy / Tiredness:  Yes  Nausea / Vomiting:  No  Double Vision:  Yes  Sleepiness:  Yes  Depression:  Yes  Memory Problems:  No  Poor Balance:  No  Dizziness:  No  Appetite Changes:  Yes, sometimes has decreased appetite  Blurred Vision:  Yes  Sleep Changes:  Yes  Behavioral Changes:  No  Skin: negative  Respiratory: Shortness of breath and Cough  Cardiovascular: negative     Exam:     /78 (BP Location: Right arm, Patient Position: Chair, Cuff Size: Adult Regular)   Pulse 64   Wt 224 lb 11.2 oz (101.9 kg)   BMI 31.63 kg/m               Wt Readings from Last 5 Encounters:   01/07/19 224 lb (101.6 kg)   08/30/18 210  lb (95.3 kg)   02/05/18 214 lb (97.1 kg)   08/10/17 197 lb 12.8 oz (89.7 kg)   05/30/17 188 lb 3.2 oz (85.4 kg)      General Appearance: Alert, awake, NAD  LANGUAGE/SPEECH: at times has difficulty finding words and expressing his ideas and is somewhat tangential.  CNs: Extraocular movements are intact. Face is symmetric.  Hearing is intact to voice.   MOTOR:  no foal weakness  COORDINATION: Slight tremors in outstretched arms bilaterally sinusoidal <0.5cm and most notable on full outstretch.    GAIT:  Steady.      Assessment and Plan:     1) Primary generalized epilepsy: he had a few episodes with brief loss of consciousness. There is no ER visit for seizures since last visit. He is taking levetiracetam, Depakote and lamotrigine.      2) Significant tremors: taking primidone 50 mg tid. Try to take it regularly. May consider increasing the dose if tremors worsens.      - Continue Depakote 500-1000 and lamotrigine 200 mg bid, and LVT 1500 mg bid.     - Continue Primidone 50 mg tid     - RTC in 6 months      Patient was told if he needs any further work notice letters to assist in him regaining employment that he would just need to let us know what the employer wants and [facility] office will provide the letter specified to whatever the employer is asking.    Patient seen and d/w with Dr. Yesi Alexander MD/PhD  [facility] Neurology  [phone]    I saw and examined the patient and discussed the plan of care with the resident. I agree with the findings, assessment and plan as described above.   Leigh Ann Fong MD        Again, thank you for allowing me to participate in the care of your patient.      Sincerely,    Leigh Ann Fong MD

## 2019-07-15 NOTE — PROGRESS NOTES
[facility]        Patient:  Johnson Crystal  :  1970   Age:  49 year old   Today's Office Visit:  7/15/2019, last visit 2019         History of Present Illness:     Johnson is here for a follow up on his seizures.     He may have had two episodes in April of brief (<3 secs) loss of attention/altered awareness.  He recalls his last seizure with loss of consciousness being in 2018.  He is taking Depakote 500-1000 and lamotrigine 200 mg bid, and LVT 1500 mg bid. He was also started on primidone in 2018, mainly for tremors. He is taking 50 mg three times a day.    He has previously described these episodes where he feels he might have had a lapse in time briefly. [place], he got up to give his brother a hug and before this, he felt somewhat unusual and felt he might have had a brief loss of consciousness.    His tremor is somewhat better on primidone, but he still has them. He is taking 50mg TID usually but may occasionally forget to take this medication in the afternoon or sleep thru the dosing time.  This is happening ~ 1/week.    Of note, patient was not allowed to go back to work earlier this year despite RTW letter.  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  Patient had employment in [place], may consider expanding his search area for new jobs.            Review of your medicines           Accurate as of 7/15/19 11:17 AM. If you have any questions, ask your nurse or doctor.               CONTINUE these medicines which have NOT CHANGED      Dose / Directions   divalproex sodium extended-release 500 MG 24 hr tablet  Commonly known as:  DEPAKOTE ER  Used for:  Seizure disorder (H)      Take 1 tab in the morning and 2 tabs in the evening  Quantity:  270 tablet  Refills:  3     lamoTRIgine 200 MG tablet  Commonly known as:  LaMICtal  Used for:  Seizure  disorder (H)      Dose:  200 mg  Take 1 tablet (200 mg) by mouth 2 times daily  Quantity:  180 tablet  Refills:  3     levETIRAcetam 750 MG tablet  Commonly known as:  KEPPRA  Used for:  Seizure disorder (H)      Dose:  1500 mg  Take 2 tablets (1,500 mg) by mouth 2 times daily  Quantity:  360 tablet  Refills:  3     MELATONIN PO      Dose:  5-10 mg  Take 5-10 mg by mouth  Refills:  0     primidone 50 MG tablet  Commonly known as:  MYSOLINE  Used for:  Seizure disorder (H)      TAKE 1/2 TAB IN THE MORNING AND 1 TAB IN THE EVENING AND 1 TAB AT BEDTIME  Quantity:  225 tablet  Refills:  3               Results for LUCIEN KING (MRN [id number]) as of 1/7/2019 15:55    Ref. Range 7/10/2017 11:49   Keppra (Levetiracetam) Level Latest Ref Range: 6.0 - 46.0 ug/mL 56.1 (A)   Lamotrigine Level Latest Ref Range: 3.0 - 15.0 ug/mL 15.9 (A)   Valproic Acid Free Latest Ref Range: 6.0 - 20.0 ug/mL 8.7   Valproic Acid Total Latest Ref Range: 50.0 - 100.0 ug/mL 74.9      Medication Notes:       Using a pill box:  No     Review of Systems:  Lethargy / Tiredness:  Yes  Nausea / Vomiting:  No  Double Vision:  Yes  Sleepiness:  Yes  Depression:  Yes  Memory Problems:  No  Poor Balance:  No  Dizziness:  No  Appetite Changes:  Yes, sometimes has decreased appetite  Blurred Vision:  Yes  Sleep Changes:  Yes  Behavioral Changes:  No  Skin: negative  Respiratory: Shortness of breath and Cough  Cardiovascular: negative     Exam:     /78 (BP Location: Right arm, Patient Position: Chair, Cuff Size: Adult Regular)   Pulse 64   Wt 224 lb 11.2 oz (101.9 kg)   BMI 31.63 kg/m              Wt Readings from Last 5 Encounters:   01/07/19 224 lb (101.6 kg)   08/30/18 210 lb (95.3 kg)   02/05/18 214 lb (97.1 kg)   08/10/17 197 lb 12.8 oz (89.7 kg)   05/30/17 188 lb 3.2 oz (85.4 kg)      General Appearance: Alert, awake, NAD  LANGUAGE/SPEECH: at times has difficulty finding words and expressing his ideas and is somewhat tangential.  CNs:  Extraocular movements are intact. Face is symmetric.  Hearing is intact to voice.   MOTOR:  no foal weakness  COORDINATION: Slight tremors in outstretched arms bilaterally sinusoidal <0.5cm and most notable on full outstretch.    GAIT:  Steady.      Assessment and Plan:     1) Primary generalized epilepsy: he had a few episodes with brief loss of consciousness. There is no ER visit for seizures since last visit. He is taking levetiracetam, Depakote and lamotrigine.      2) Significant tremors: taking primidone 50 mg tid. Try to take it regularly. May consider increasing the dose if tremors worsens.      - Continue Depakote 500-1000 and lamotrigine 200 mg bid, and LVT 1500 mg bid.     - Continue Primidone 50 mg tid     - RTC in 6 months      Patient was told if he needs any further work notice letters to assist in him regaining employment that he would just need to let us know what the employer wants and [facility] office will provide the letter specified to whatever the employer is asking.    Patient seen and d/w with Dr. Yesi Alexander MD/PhD  [facility]  [phone]    I saw and examined the patient and discussed the plan of care with the resident. I agree with the findings, assessment and plan as described above.   Leigh Ann Fong MD

## 2019-07-19 ENCOUNTER — TELEPHONE (OUTPATIENT)
Dept: NEUROLOGY | Facility: CLINIC | Age: 49
End: 2019-07-19

## 2019-07-19 NOTE — LETTER
2019    RE: Johnson Crystal  : 1970        To whom it may concern,    Mr. Johnson Crystal has been under my care for his epilepsy since May 2017.      He is currently prescribed Levetiracetam, Depakote, Primidone, and Lamotrigine. Seizures are fairly controlled for the most part.     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     Please contact me at [phone] if you had any questions or concerns.     Sincerely,            Leigh Ann Fong MD

## 2019-07-19 NOTE — TELEPHONE ENCOUNTER
What is the concern that needs to be addressed by a nurse? Needs a letter stating he can go back to work.     May a detailed message be left on voicemail? yes    Date of last office visit:     Message routed to: [facility] rn pool

## 2019-12-23 DIAGNOSIS — G40.909 SEIZURE DISORDER (H): ICD-10-CM

## 2019-12-24 NOTE — TELEPHONE ENCOUNTER
Refill requested for: Lamotrigine 200 mg    Last ordered: 1/7/19    Last Filled:     Last Clinic Visit: 7/15/19    Next Clinic Visit: 1/16/20    From last visit note:  Assessment and Plan:     1) Primary generalized epilepsy: he had a few episodes with brief loss of consciousness. There is no ER visit for seizures since last visit. He is taking levetiracetam, Depakote and lamotrigine.      2) Significant tremors: taking primidone 50 mg tid. Try to take it regularly. May consider increasing the dose if tremors worsens.      - Continue Depakote 500-1000 and lamotrigine 200 mg bid, and LVT 1500 mg bid.     - Continue Primidone 50 mg tid     - RTC in 6 months    Action taken:   Refilled to bridge to appointment.

## 2019-12-26 RX ORDER — LAMOTRIGINE 200 MG/1
200 TABLET ORAL 2 TIMES DAILY
Qty: 180 TABLET | Refills: 0 | Status: SHIPPED | OUTPATIENT
Start: 2019-12-26 | End: 2020-01-16

## 2019-12-26 RX ORDER — LAMOTRIGINE 200 MG/1
200 TABLET ORAL 2 TIMES DAILY
Qty: 180 TABLET | Refills: 3 | OUTPATIENT
Start: 2019-12-26

## 2020-01-09 DIAGNOSIS — G40.909 SEIZURE DISORDER (H): ICD-10-CM

## 2020-01-13 RX ORDER — PRIMIDONE 50 MG/1
TABLET ORAL
Qty: 225 TABLET | Refills: 3 | OUTPATIENT
Start: 2020-01-13

## 2020-01-13 RX ORDER — DIVALPROEX SODIUM 500 MG/1
TABLET, EXTENDED RELEASE ORAL
Qty: 90 TABLET | OUTPATIENT
Start: 2020-01-13

## 2020-01-13 RX ORDER — LEVETIRACETAM 750 MG/1
TABLET ORAL
Qty: 120 TABLET | OUTPATIENT
Start: 2020-01-13

## 2020-01-16 ENCOUNTER — OFFICE VISIT (OUTPATIENT)
Dept: NEUROLOGY | Facility: CLINIC | Age: 50
End: 2020-01-16
Payer: COMMERCIAL

## 2020-01-16 VITALS
HEART RATE: 63 BPM | BODY MASS INDEX: 32.41 KG/M2 | DIASTOLIC BLOOD PRESSURE: 86 MMHG | WEIGHT: 230.2 LBS | SYSTOLIC BLOOD PRESSURE: 131 MMHG

## 2020-01-16 DIAGNOSIS — G40.909 SEIZURE DISORDER (H): ICD-10-CM

## 2020-01-16 RX ORDER — DIVALPROEX SODIUM 500 MG/1
TABLET, EXTENDED RELEASE ORAL
Qty: 270 TABLET | Refills: 3 | Status: SHIPPED | OUTPATIENT
Start: 2020-01-16 | End: 2020-01-16

## 2020-01-16 RX ORDER — DIVALPROEX SODIUM 500 MG/1
TABLET, EXTENDED RELEASE ORAL
Qty: 360 TABLET | Refills: 3 | Status: SHIPPED | OUTPATIENT
Start: 2020-01-16 | End: 2020-01-20

## 2020-01-16 RX ORDER — LEVETIRACETAM 750 MG/1
1500 TABLET ORAL 2 TIMES DAILY
Qty: 360 TABLET | Refills: 3 | Status: SHIPPED | OUTPATIENT
Start: 2020-01-16 | End: 2021-01-14

## 2020-01-16 RX ORDER — LAMOTRIGINE 200 MG/1
200 TABLET ORAL 2 TIMES DAILY
Qty: 180 TABLET | Refills: 3 | Status: SHIPPED | OUTPATIENT
Start: 2020-01-16 | End: 2021-01-25

## 2020-01-16 RX ORDER — PRIMIDONE 50 MG/1
TABLET ORAL
Qty: 225 TABLET | Refills: 3 | Status: SHIPPED | OUTPATIENT
Start: 2020-01-16 | End: 2021-02-03

## 2020-01-16 ASSESSMENT — PAIN SCALES - GENERAL: PAINLEVEL: NO PAIN (0)

## 2020-01-16 NOTE — PROGRESS NOTES
"[facility]      Patient:  Lucien Crystal  :  1970   Age:  49 year old   Today's Office Visit:  2020    Epilepsy Data:                    History of Present Illness:    Lucien is here for a follow up on his seizures. 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 He was talking and suddenly he felt he is blanking and when he was asked questions, he couldn't give the right answers and then he was told he was saying things that he realized he wouldn't say in normal situation. He denies missing medication, illness or other triggers. 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   He is taking Depakote 500-1000, LVT 1500 mg bid, and  mg bid.     Tremors: Primidone 25-50-50.       Current Outpatient Medications   Medication Sig Dispense Refill     divalproex sodium extended-release (DEPAKOTE ER) 500 MG 24 hr tablet Take 1 tab in the morning and 2 tabs in the evening 270 tablet 3     lamoTRIgine (LAMICTAL) 200 MG tablet Take 1 tablet (200 mg) by mouth 2 times daily 180 tablet 0     levETIRAcetam (KEPPRA) 750 MG tablet Take 2 tablets (1,500 mg) by mouth 2 times daily 360 tablet 3     MELATONIN PO Take 5-10 mg by mouth       primidone (MYSOLINE) 50 MG tablet TAKE 1/2 TAB IN THE MORNING AND 1 TAB IN THE EVENING AND 1 TAB AT BEDTIME 225 tablet 3      Results for LUCIEN CRYSTAL (MRN [id number]) as of 2020 11:28   Ref. Range 2019 08:46   Keppra (Levetiracetam) Level Latest Ref Range: 6.0 - 46.0 ug/mL 21.0   Lamotrigine Level Latest Ref Range: 3.0 - 15.0 ug/mL 9.8   Primidone Level Latest Ref Range: 5.0 - 12.0 ug/mL 2.4 (A)   Valproic Acid Free Latest Ref Range: 6.0 - 20.0  8.6   Valproic Acid Total Latest Ref Range: 50.0 - 100.0  79.0     Medication Notes:        AED Medication Compliance:  {COMPLIANCE:5303::\"compliant most of the time\"}  Using a pill box:  {YES / " "NO:249251::\"Yes\"}    Review of Systems:  Lethargy / Tiredness:  Yes  Nausea / Vomiting:  No  Double Vision:  sometimes  Sleepiness:  Yes  Depression:  Yes  Memory Problems:  Yes  Poor Balance:  Yes  Dizziness:  No  Appetite Changes:  No  Blurred Vision:  Yes, improves with eyeglasses  Sleep Changes:  Yes  Behavioral Changes:  No  Skin: negative  Respiratory: No shortness of breath and No cough  Cardiovascular: negative  Have you experienced a traumatic fall since your last visit: NO      Exam:    /86   Pulse 63   Wt 230 lb 3.2 oz (104.4 kg)   BMI 32.41 kg/m       Wt Readings from Last 5 Encounters:   01/16/20 230 lb 3.2 oz (104.4 kg)   07/15/19 224 lb 11.2 oz (101.9 kg)   01/07/19 224 lb (101.6 kg)   08/30/18 210 lb (95.3 kg)   02/05/18 214 lb (97.1 kg)     General Appearance: {NORMAL/AB/NE:007766::\"Normal\"}  Gait:  {NORMAL/AB/NE:489948::\"Normal\"}  Attention Span:  {NORMAL/AB/NE:608751::\"Normal\"}  Language:  {NORMAL/AB/NE:991287::\"Normal\"}  Extraocular Movements:  {NORMAL/AB/NE:873986::\"Normal\"}  Coordination:  {NORMAL/AB/NE:960275::\"Normal\"}  Visual Fields:  {NORMAL/AB/NE:617760::\"Normal\"}  Facial Sensation:  {NORMAL/AB/NE:037218::\"Normal\"}  Facial Strength:  {NORMAL/AB/NE:563116::\"Normal\"}  Tongue Strength:  {NORMAL/AB/NE:789777::\"Normal\"}  Limb Strength:  {NORMAL/AB/NE:718314::\"Normal\"}  Limb Tone:  {NORMAL/AB/NE:592328::\"Normal\"}  Limb Sensation:  {NORMAL/AB/NE:868337::\"Normal\"}  General Physical Findings:  {NORMAL/AB/NE:238819::\"Normal\"}    Assessment and Plan:    1. Idiopathic generalized epilepsy: he had one seizure in April and one in August. He had brief loss of attention.     - Increase Depakote to 1000 mg bid  - Continue Keppra 1500 mg bid    2. Tremors: primidone helps, if he doesn't take it, he starts to shake. He is taking -100     As described above, I met with the patient for *** minutes and during this time counseling was {GREATER THAN_LESS THAN_WITHIN:402045685} 50% of the visit " time.  Leigh Ann Fong MD

## 2020-01-16 NOTE — LETTER
2020       RE: Johnson Crystal  : 1970   MRN: [id number]      Dear Colleague,    Thank you for referring your patient, Johnson Crystal, to the [facility] EPILEPSY CARE at [facility]. Please see a copy of my visit note below.    [facility] Epilepsy Care Progress Note      Patient:  Johnson Crystal  :  1970   Age:  49 year old   Today's Office Visit:  2020    Epilepsy Data:    Patient History  Primary Epileptologist/Provider: Leigh Ann Fong M.D.  Epilepsy Syndrome: Generalized Epilepsy unspecified  Age of Onset: childhood  Etiology  : Idiopathic     Tests/Surgery History  Last EE19  Last MRI: 4/10/17    Seizure Record  Current Visit Date: 20  Previous Visit Date: (P) 07/15/19  Months since last visit: (P) 6.08  Seizure Type 1: Tonic-clonic seizures  Seizure Type 2: (P) Unspecified Staring Spell  # of Type 2 Seizure since last visit: (P) 1  Freq. Type 2 / Month: (P) 0.16    History of Present Illness:    Johnson is here for a follow up on his seizures. 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 He was talking and suddenly he felt he is blanking and when he was asked questions, he couldn't give the right answers and then he was told he was saying things that he realized he wouldn't say in normal situation. He denies missing medication, illness or other triggers. 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   He is taking Depakote 500-1000, LVT 1500 mg bid, and  mg bid.     Tremors: he is taking Primidone 25-50-50. Improved much.      Current Outpatient Medications   Medication Sig Dispense Refill     divalproex sodium extended-release (DEPAKOTE ER) 500 MG 24 hr tablet Take 1 tab in the morning and 2 tabs in the evening 270 tablet 3     lamoTRIgine (LAMICTAL) 200 MG tablet Take 1 tablet (200 mg) by mouth 2 times daily  180 tablet 0     levETIRAcetam (KEPPRA) 750 MG tablet Take 2 tablets (1,500 mg) by mouth 2 times daily 360 tablet 3     MELATONIN PO Take 5-10 mg by mouth       primidone (MYSOLINE) 50 MG tablet TAKE 1/2 TAB IN THE MORNING AND 1 TAB IN THE EVENING AND 1 TAB AT BEDTIME 225 tablet 3      Results for LUCIEN KING (MRN [id number]) as of 1/16/2020 11:28   Ref. Range 1/9/2019 08:46   Keppra (Levetiracetam) Level Latest Ref Range: 6.0 - 46.0 ug/mL 21.0   Lamotrigine Level Latest Ref Range: 3.0 - 15.0 ug/mL 9.8   Primidone Level Latest Ref Range: 5.0 - 12.0 ug/mL 2.4 (A)   Valproic Acid Free Latest Ref Range: 6.0 - 20.0  8.6   Valproic Acid Total Latest Ref Range: 50.0 - 100.0  79.0     Medication Notes:        AED Medication Compliance:  compliant most of the time    Review of Systems:  Lethargy / Tiredness:  Yes  Nausea / Vomiting:  No  Double Vision:  sometimes  Sleepiness:  Yes  Depression:  Yes  Memory Problems:  Yes  Poor Balance:  Yes  Dizziness:  No  Appetite Changes:  No  Blurred Vision:  Yes, improves with eyeglasses  Sleep Changes:  Yes  Behavioral Changes:  No  Skin: negative  Respiratory: No shortness of breath and No cough  Cardiovascular: negative  Have you experienced a traumatic fall since your last visit: NO      Exam:    /86   Pulse 63   Wt 230 lb 3.2 oz (104.4 kg)   BMI 32.41 kg/m        Wt Readings from Last 5 Encounters:   01/16/20 230 lb 3.2 oz (104.4 kg)   07/15/19 224 lb 11.2 oz (101.9 kg)   01/07/19 224 lb (101.6 kg)   08/30/18 210 lb (95.3 kg)   02/05/18 214 lb (97.1 kg)     General Appearance: Alert, awake, cooperative, pleasant, NAD  Gait: steady  Attention Span:  Normal  Language/speech: no aphasia or dysarthria  Extraocular Movements:  Normal  Coordination:   slight tremors bilaterally in outstretched arms  Facial Strength:  Normal  Tongue Strength:  Normal  Motor Exam: normal tone, bulk and strength 5/5 bilaterally    Assessment and Plan:    1. Idiopathic generalized epilepsy: he  had one seizure since last visit. He had brief impaired awareness. It was probably due to stress. We discussed increasing his Depakote by 500 mg/d.     2. Tremors: primidone helps, if he doesn't take it, he starts to shake. He is taking -100.    - Increase Depakote to 1000 mg bid  - Continue Keppra 1500 mg bid and  mg bid  - Continue primidone -100      As described above, I met with the patient for 20 minutes and during this time counseling was greater than 50% of the visit time.  Leigh Ann Fong MD                    Again, thank you for allowing me to participate in the care of your patient.      Sincerely,    Leigh Ann Fong MD

## 2020-01-20 RX ORDER — DIVALPROEX SODIUM 500 MG/1
TABLET, EXTENDED RELEASE ORAL
Qty: 360 TABLET | Refills: 3 | Status: SHIPPED | OUTPATIENT
Start: 2020-01-20 | End: 2021-01-22

## 2020-01-20 NOTE — PROGRESS NOTES
[facility] Progress Note      Patient:  Johnson Crystal  :  1970   Age:  49 year old   Today's Office Visit:  2020    Epilepsy Data:    Patient History  Primary Epileptologist/Provider: Leigh Ann Fong M.D.  Epilepsy Syndrome: Generalized Epilepsy unspecified  Age of Onset: childhood  Etiology  : Idiopathic     Tests/Surgery History  Last EE19  Last MRI: 4/10/17    Seizure Record  Current Visit Date: 20  Previous Visit Date: (P) 07/15/19  Months since last visit: (P) 6.08  Seizure Type 1: Tonic-clonic seizures  Seizure Type 2: (P) Unspecified Staring Spell  # of Type 2 Seizure since last visit: (P) 1  Freq. Type 2 / Month: (P) 0.16    History of Present Illness:    Johnson is here for a follow up on his seizures. 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 He was talking and suddenly he felt he is blanking and when he was asked questions, he couldn't give the right answers and then he was told he was saying things that he realized he wouldn't say in normal situation. He denies missing medication, illness or other triggers. 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   He is taking Depakote 500-1000, LVT 1500 mg bid, and  mg bid.     Tremors: he is taking Primidone 25-50-50. Improved much.      Current Outpatient Medications   Medication Sig Dispense Refill     divalproex sodium extended-release (DEPAKOTE ER) 500 MG 24 hr tablet Take 1 tab in the morning and 2 tabs in the evening 270 tablet 3     lamoTRIgine (LAMICTAL) 200 MG tablet Take 1 tablet (200 mg) by mouth 2 times daily 180 tablet 0     levETIRAcetam (KEPPRA) 750 MG tablet Take 2 tablets (1,500 mg) by mouth 2 times daily 360 tablet 3     MELATONIN PO Take 5-10 mg by mouth       primidone (MYSOLINE) 50 MG tablet TAKE 1/2 TAB IN THE MORNING AND 1 TAB IN THE EVENING AND 1 TAB AT BEDTIME  225 tablet 3      Results for LUCIEN KING (MRN [id number]) as of 1/16/2020 11:28   Ref. Range 1/9/2019 08:46   Keppra (Levetiracetam) Level Latest Ref Range: 6.0 - 46.0 ug/mL 21.0   Lamotrigine Level Latest Ref Range: 3.0 - 15.0 ug/mL 9.8   Primidone Level Latest Ref Range: 5.0 - 12.0 ug/mL 2.4 (A)   Valproic Acid Free Latest Ref Range: 6.0 - 20.0  8.6   Valproic Acid Total Latest Ref Range: 50.0 - 100.0  79.0     Medication Notes:        AED Medication Compliance:  compliant most of the time    Review of Systems:  Lethargy / Tiredness:  Yes  Nausea / Vomiting:  No  Double Vision:  sometimes  Sleepiness:  Yes  Depression:  Yes  Memory Problems:  Yes  Poor Balance:  Yes  Dizziness:  No  Appetite Changes:  No  Blurred Vision:  Yes, improves with eyeglasses  Sleep Changes:  Yes  Behavioral Changes:  No  Skin: negative  Respiratory: No shortness of breath and No cough  Cardiovascular: negative  Have you experienced a traumatic fall since your last visit: NO      Exam:    /86   Pulse 63   Wt 230 lb 3.2 oz (104.4 kg)   BMI 32.41 kg/m       Wt Readings from Last 5 Encounters:   01/16/20 230 lb 3.2 oz (104.4 kg)   07/15/19 224 lb 11.2 oz (101.9 kg)   01/07/19 224 lb (101.6 kg)   08/30/18 210 lb (95.3 kg)   02/05/18 214 lb (97.1 kg)     General Appearance: Alert, awake, cooperative, pleasant, NAD  Gait: steady  Attention Span:  Normal  Language/speech: no aphasia or dysarthria  Extraocular Movements:  Normal  Coordination:  slight tremors bilaterally in outstretched arms  Facial Strength:  Normal  Tongue Strength:  Normal  Motor Exam: normal tone, bulk and strength 5/5 bilaterally    Assessment and Plan:    1. Idiopathic generalized epilepsy: he had one seizure since last visit. He had brief impaired awareness. It was probably due to stress. We discussed increasing his Depakote by 500 mg/d.     2. Tremors: primidone helps, if he doesn't take it, he starts to shake. He is taking -100.    - Increase Depakote  to 1000 mg bid  - Continue Keppra 1500 mg bid and  mg bid  - Continue primidone -100      As described above, I met with the patient for 20 minutes and during this time counseling was greater than 50% of the visit time.  Leigh Ann Fong MD

## 2020-02-03 ENCOUNTER — TELEPHONE (OUTPATIENT)
Dept: NEUROLOGY | Facility: CLINIC | Age: 50
End: 2020-02-03

## 2020-02-03 NOTE — LETTER
2/6/2020       RE: Johnson Crystal  [address] [address]           To Whom it May Concern,       Mr. Johnson Crystal has been under my care for his epilepsy since May 2017.       He is currently prescribed Levetiracetam, Depakote, Primidone, and Lamotrigine. Seizures are fairly well controlled for the most part, with occasional breakthrough seizures.     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      Please contact me at [phone] if you had any questions or concerns.      Sincerely,                 Leigh Ann Fong MD

## 2020-02-21 ENCOUNTER — TELEPHONE (OUTPATIENT)
Dept: NEUROLOGY | Facility: CLINIC | Age: 50
End: 2020-02-21

## 2020-02-21 NOTE — TELEPHONE ENCOUNTER
"What is the concern that needs to be addressed by a nurse? Pt states that we need to \"authorize him for more visits\" with us. He states there isn't a form to fill out?     May a detailed message be left on voicemail? Yes     Date of last office visit: 1/16/2020    Message routed to: [facility] RN Pool     "

## 2020-09-14 ENCOUNTER — VIRTUAL VISIT (OUTPATIENT)
Dept: NEUROLOGY | Facility: CLINIC | Age: 50
End: 2020-09-14
Payer: COMMERCIAL

## 2020-09-14 DIAGNOSIS — G40.109 FOCAL EPILEPSY (H): Primary | ICD-10-CM

## 2020-09-14 NOTE — LETTER
"2020       RE: Johnson Crystal  : 1970   MRN: [id number]      Dear Colleague,    Thank you for referring your patient, Johnson Crystal, to the [facility] EPILEPSY CARE at [facility]. Please see a copy of my visit note below.    Johnson Crystal is a 50 year old male who is being evaluated via a billable video visit.      The patient has been notified of following:     \"This video visit will be conducted via a call between you and your physician/provider. We have found that certain health care needs can be provided without the need for an in-person physical exam.  This service lets us provide the care you need with a video conversation.  If a prescription is necessary we can send it directly to your pharmacy.  If lab work is needed we can place an order for that and you can then stop by our lab to have the test done at a later time.    Video visits are billed at different rates depending on your insurance coverage.  Please reach out to your insurance provider with any questions.    If during the course of the call the physician/provider feels a video visit is not appropriate, you will not be charged for this service.\"    Patient has given verbal consent for Video visit? Yes  How would you like to obtain your AVS? Mail a copy  If you are dropped from the video visit, the video invite should be resent to: Text to cell phone: [phone]  Will anyone else be joining your video visit? Yes: wife. How would they like to receive their invitation? Text to cell phone: [phone]        Link was sent to the number provided, however, the patient did not show up for the video visit.   Leigh Ann Fong MD      Again, thank you for allowing me to participate in the care of your patient.      Sincerely,    Leigh Ann Fong MD      "

## 2020-09-14 NOTE — PROGRESS NOTES
"Johnson Crystal is a 50 year old male who is being evaluated via a billable video visit.      The patient has been notified of following:     \"This video visit will be conducted via a call between you and your physician/provider. We have found that certain health care needs can be provided without the need for an in-person physical exam.  This service lets us provide the care you need with a video conversation.  If a prescription is necessary we can send it directly to your pharmacy.  If lab work is needed we can place an order for that and you can then stop by our lab to have the test done at a later time.    Video visits are billed at different rates depending on your insurance coverage.  Please reach out to your insurance provider with any questions.    If during the course of the call the physician/provider feels a video visit is not appropriate, you will not be charged for this service.\"    Patient has given verbal consent for Video visit? Yes  How would you like to obtain your AVS? Mail a copy  If you are dropped from the video visit, the video invite should be resent to: Text to cell phone: [phone]  Will anyone else be joining your video visit? Yes: wife. How would they like to receive their invitation? Text to cell phone: [phone]        Link was sent to the number provided, however, the patient did not show up for the video visit.   Leigh Ann Fong MD    "

## 2020-10-26 ENCOUNTER — VIRTUAL VISIT (OUTPATIENT)
Dept: NEUROLOGY | Facility: CLINIC | Age: 50
End: 2020-10-26
Payer: COMMERCIAL

## 2020-10-26 ENCOUNTER — TELEPHONE (OUTPATIENT)
Dept: NEUROLOGY | Facility: CLINIC | Age: 50
End: 2020-10-26

## 2020-10-26 DIAGNOSIS — G40.319 INTRACTABLE GENERALIZED IDIOPATHIC EPILEPSY WITHOUT STATUS EPILEPTICUS (H): Primary | ICD-10-CM

## 2020-10-26 DIAGNOSIS — G40.109 FOCAL EPILEPSY (H): Primary | ICD-10-CM

## 2020-10-26 RX ORDER — DICLOFENAC SODIUM 75 MG/1
75 TABLET, DELAYED RELEASE ORAL
COMMUNITY
Start: 2020-10-16

## 2020-10-26 RX ORDER — AMLODIPINE BESYLATE 5 MG/1
5 TABLET ORAL
COMMUNITY
Start: 2020-02-13

## 2020-10-26 NOTE — LETTER
"10/26/2020       RE: Johnson Crystal  : 1970   MRN: [id number]      Dear Colleague,    Thank you for referring your patient, Johnson Crystal, to the [facility] EPILEPSY CARE at [facility]. Please see a copy of my visit note below.    Johnson Crystal is a 50 year old male who is being evaluated via a billable video visit.      The patient has been notified of following:     \"This video visit will be conducted via a call between you and your physician/provider. We have found that certain health care needs can be provided without the need for an in-person physical exam.  This service lets us provide the care you need with a video conversation.  If a prescription is necessary we can send it directly to your pharmacy.  If lab work is needed we can place an order for that and you can then stop by our lab to have the test done at a later time.    Video visits are billed at different rates depending on your insurance coverage.  Please reach out to your insurance provider with any questions.    If during the course of the call the physician/provider feels a video visit is not appropriate, you will not be charged for this service.\"    Patient has given verbal consent for Video visit? Yes  How would you like to obtain your AVS? Mail a copy  If you are dropped from the video visit, the video invite should be resent to: Text to cell phone: [phone]  Will anyone else be joining your video visit? No        Video-Visit Details    Type of service:  Video Visit    Video Start Time: 1127  Video End Time: 1155    Originating Location (pt. Location): Home    Distant Location (provider location):  [facility] EPILEPSY CARE     Platform used for Video Visit: Juan Garner DO  Neurology PGY-3     P/[facility] Epilepsy Care Progress Note      Patient:  Johnson Crystal  :  1970   Age:  50 year old   Today's video Visit:  10/26/2020    Epilepsy Data:      Patient History  Primary " Epileptologist/Provider: Leigh Ann Fong M.D.  Epilepsy Syndrome: Generalized Epilepsy unspecified  Age of Onset: childhood  Etiology  : Idiopathic     Tests/Surgery History  Last EE19  Last MRI: 4/10/17    Seizure Record  Current Visit Date: 10/25/20  Previous Visit Date: 20  Months since last visit: 9.3  Seizure Type 1: Tonic-clonic seizures  Description of Sz Type 1: 0  # of Type 1 Seizure since last visit: 0  Freq. Type 1 / Month: 0  Seizure Type 2: Unspecified Staring Spell  # of Type 2 Seizure since last visit: 5  Freq. Type 2 / Month: 0.54    History of Present Illness:   Johnson is here for follow up since last visit 2020. He has missed a few visits since then. He has had about 5 seizures in total. They have all be staring spells and unresponsiveness. He has had no shaking or other witnessed abnormal movements.     He feels the seizures can be triggered by bright lights or periods of stress or intense thinking. He has had some dizziness which is mild, he relates this to his Depakote. He states it was severe when he first started, now he notices it occasionally. His tremor is well controlled with primidone, He does notice it worsen if he misses a dose.     Current Outpatient Medications   Medication Sig Dispense Refill     amLODIPine (NORVASC) 5 MG tablet Take 5 mg by mouth       diclofenac (VOLTAREN) 75 MG EC tablet Take 75 mg by mouth       divalproex sodium extended-release (DEPAKOTE ER) 500 MG 24 hr tablet Take 2 tabs in the morning and 2 tabs in the evening 360 tablet 3     lamoTRIgine (LAMICTAL) 200 MG tablet Take 1 tablet (200 mg) by mouth 2 times daily 180 tablet 3     levETIRAcetam (KEPPRA) 750 MG tablet Take 2 tablets (1,500 mg) by mouth 2 times daily 360 tablet 3     MELATONIN PO Take 5-10 mg by mouth       primidone (MYSOLINE) 50 MG tablet TAKE 1/2 TAB IN THE MORNING AND 1 TAB IN THE EVENING AND 1 TAB AT BEDTIME 225 tablet 3        Medication Notes:       AED Medication  Compliance:  compliant most of the time    Review of Systems:  Lethargy / Tiredness:  Yes  Nausea / Vomiting:  No  Double Vision:  No  Sleepiness:  Yes  Depression:  No, wife says he is more depressed, out of work  Slowed Cognitive Function:  No  Memory Problems:  Yes, repeating himself frequently   Poor Balance:  Yes  Dizziness:  No  Appetite Changes:  No  Blurred Vision:  Yes, updated glasses x 2 in the past year  Sleep Changes:  Yes, waking up through the night, catching up during the day, started taking melatonin   Behavioral Changes:  No  Skin: negative  Respiratory: No shortness of breath, dyspnea on exertion, cough, or hemoptysis  Cardiovascular: negative  Have you experienced a traumatic fall since your last visit: YES  Are these falls related to your seizures: NO    Other Issues:    Is patient safe to drive:  No    Exam:    There were no vitals taken for this visit.     Wt Readings from Last 5 Encounters:   01/16/20 230 lb 3.2 oz (104.4 kg)   07/15/19 224 lb 11.2 oz (101.9 kg)   01/07/19 224 lb (101.6 kg)   08/30/18 210 lb (95.3 kg)   02/05/18 214 lb (97.1 kg)       General Appearance: Normal  Gait:  Not Examined  Attention Span:  Normal  Language/speech: no aphasia or dysarthria   Extraocular Movements:  Normal  Facial Strength:  Normal  General Physical Findings:  Normal    Assessment and Plan:   1. Idiopathic generalized epilepsy: he had 5 seizures since last visit. He had brief impaired awareness. At last visit 1/16/2020 increased his Depakote by 500 mg/d. He noticed some balance difficulty when he initially increased his dose.      2. Tremors: primidone helps, if he doesn't take it, he starts to shake.     - Continue Depakote 1000 mg bid, Keppra 1500 mg bid and  mg bid  - Continue primidone -100  - Follow up in 6 months        Patient seen and evaluated with Dr. Key. Spent 28 min with the patient via [employer] video.   Mela Garner, DO  Neurology PGY-3   I saw and evaluated the patient and  discussed the plan of care with Dr. Garner. I have reviewed her note and agree with the findings, impression and plan.   Leigh Ann Fong MD                        Again, thank you for allowing me to participate in the care of your patient.      Sincerely,    Leigh Ann Fong MD

## 2020-10-26 NOTE — PROGRESS NOTES
"Johnson Crystal is a 50 year old male who is being evaluated via a billable video visit.      The patient has been notified of following:     \"This video visit will be conducted via a call between you and your physician/provider. We have found that certain health care needs can be provided without the need for an in-person physical exam.  This service lets us provide the care you need with a video conversation.  If a prescription is necessary we can send it directly to your pharmacy.  If lab work is needed we can place an order for that and you can then stop by our lab to have the test done at a later time.    Video visits are billed at different rates depending on your insurance coverage.  Please reach out to your insurance provider with any questions.    If during the course of the call the physician/provider feels a video visit is not appropriate, you will not be charged for this service.\"    Patient has given verbal consent for Video visit? Yes  How would you like to obtain your AVS? Mail a copy  If you are dropped from the video visit, the video invite should be resent to: Text to cell phone: [phone]  Will anyone else be joining your video visit? No        Video-Visit Details    Type of service:  Video Visit    Video Start Time: 1127  Video End Time: 1155    Originating Location (pt. Location): Home    Distant Location (provider location):  [facility] EPILEPSY CARE     Platform used for Video Visit: Juan Garner DO  Neurology PGY-3     P/[facility] Epilepsy Care Progress Note      Patient:  Johnson Crystal  :  1970   Age:  50 year old   Today's video Visit:  10/26/2020    Epilepsy Data:      Patient History  Primary Epileptologist/Provider: Leigh Ann Fong M.D.  Epilepsy Syndrome: Generalized Epilepsy unspecified  Age of Onset: childhood  Etiology  : Idiopathic     Tests/Surgery History  Last EE19  Last MRI: 4/10/17    Seizure Record  Current Visit Date: 10/25/20  Previous Visit Date: " 01/16/20  Months since last visit: 9.3  Seizure Type 1: Tonic-clonic seizures  Description of Sz Type 1: 0  # of Type 1 Seizure since last visit: 0  Freq. Type 1 / Month: 0  Seizure Type 2: Unspecified Staring Spell  # of Type 2 Seizure since last visit: 5  Freq. Type 2 / Month: 0.54    History of Present Illness:   Johnson is here for follow up since last visit 1/16/2020. He has missed a few visits since then. He has had about 5 seizures in total. They have all be staring spells and unresponsiveness. He has had no shaking or other witnessed abnormal movements.     He feels the seizures can be triggered by bright lights or periods of stress or intense thinking. He has had some dizziness which is mild, he relates this to his Depakote. He states it was severe when he first started, now he notices it occasionally. His tremor is well controlled with primidone, He does notice it worsen if he misses a dose.     Current Outpatient Medications   Medication Sig Dispense Refill     amLODIPine (NORVASC) 5 MG tablet Take 5 mg by mouth       diclofenac (VOLTAREN) 75 MG EC tablet Take 75 mg by mouth       divalproex sodium extended-release (DEPAKOTE ER) 500 MG 24 hr tablet Take 2 tabs in the morning and 2 tabs in the evening 360 tablet 3     lamoTRIgine (LAMICTAL) 200 MG tablet Take 1 tablet (200 mg) by mouth 2 times daily 180 tablet 3     levETIRAcetam (KEPPRA) 750 MG tablet Take 2 tablets (1,500 mg) by mouth 2 times daily 360 tablet 3     MELATONIN PO Take 5-10 mg by mouth       primidone (MYSOLINE) 50 MG tablet TAKE 1/2 TAB IN THE MORNING AND 1 TAB IN THE EVENING AND 1 TAB AT BEDTIME 225 tablet 3        Medication Notes:       AED Medication Compliance:  compliant most of the time    Review of Systems:  Lethargy / Tiredness:  Yes  Nausea / Vomiting:  No  Double Vision:  No  Sleepiness:  Yes  Depression:  No, wife says he is more depressed, out of work  Slowed Cognitive Function:  No  Memory Problems:  Yes, repeating himself  frequently   Poor Balance:  Yes  Dizziness:  No  Appetite Changes:  No  Blurred Vision:  Yes, updated glasses x 2 in the past year  Sleep Changes:  Yes, waking up through the night, catching up during the day, started taking melatonin   Behavioral Changes:  No  Skin: negative  Respiratory: No shortness of breath, dyspnea on exertion, cough, or hemoptysis  Cardiovascular: negative  Have you experienced a traumatic fall since your last visit: YES  Are these falls related to your seizures: NO    Other Issues:    Is patient safe to drive:  No    Exam:    There were no vitals taken for this visit.     Wt Readings from Last 5 Encounters:   01/16/20 230 lb 3.2 oz (104.4 kg)   07/15/19 224 lb 11.2 oz (101.9 kg)   01/07/19 224 lb (101.6 kg)   08/30/18 210 lb (95.3 kg)   02/05/18 214 lb (97.1 kg)       General Appearance: Normal  Gait:  Not Examined  Attention Span:  Normal  Language/speech: no aphasia or dysarthria   Extraocular Movements:  Normal  Facial Strength:  Normal  General Physical Findings:  Normal    Assessment and Plan:   1. Idiopathic generalized epilepsy: he had 5 seizures since last visit. He had brief impaired awareness. At last visit 1/16/2020 increased his Depakote by 500 mg/d. He noticed some balance difficulty when he initially increased his dose.      2. Tremors: primidone helps, if he doesn't take it, he starts to shake.     - Continue Depakote 1000 mg bid, Keppra 1500 mg bid and  mg bid  - Continue primidone -100  - Follow up in 6 months        Patient seen and evaluated with Dr. Key. Spent 28 min with the patient via [employer] video.   Mela Garner DO  Neurology PGY-3   I saw and evaluated the patient and discussed the plan of care with Dr. Garner. I have reviewed her note and agree with the findings, impression and plan.   Leigh Ann Fong MD

## 2021-01-12 DIAGNOSIS — G40.909 SEIZURE DISORDER (H): ICD-10-CM

## 2021-01-14 RX ORDER — LEVETIRACETAM 750 MG/1
1500 TABLET ORAL 2 TIMES DAILY
Qty: 360 TABLET | Refills: 3 | Status: SHIPPED | OUTPATIENT
Start: 2021-01-14 | End: 2021-12-02

## 2021-01-14 NOTE — TELEPHONE ENCOUNTER
levETIRAcetam (KEPPRA) 750 MG tablet   Last Written Prescription Date:  1/16/20  Last Fill Quantity: 360,   # refills: 3  Last Office Visit :10/26/20  Future Office visit:  none

## 2021-01-20 DIAGNOSIS — G40.909 SEIZURE DISORDER (H): ICD-10-CM

## 2021-01-21 DIAGNOSIS — G40.909 SEIZURE DISORDER (H): ICD-10-CM

## 2021-01-22 RX ORDER — DIVALPROEX SODIUM 500 MG/1
TABLET, EXTENDED RELEASE ORAL
Qty: 360 TABLET | Refills: 0 | Status: SHIPPED | OUTPATIENT
Start: 2021-01-22 | End: 2021-02-03

## 2021-01-22 NOTE — TELEPHONE ENCOUNTER
DIVALPROEX SODIUM 500MG ER TAB     Last Written Prescription Date:  1/20/2020  Last Fill Quantity: 360,   # refills: 3  Last Office Visit : 10/26/2020  Future Office visit:  None  360 Tabs sent to pharm 1/22/2021      Ying Anthony RN  Central Triage Red Flags/Med Refills    Warnings Override History for divalproex sodium extended-release (DEPAKOTE ER) 500 MG 24 hr tablet [[id number]]    Overridden by Leigh Ann Fong MD on Jan 20, 2020 1:06 PM   Drug-Drug   1. VALPROIC ACID / LAMOTRIGINE [Level: Major]   Other Orders: lamoTRIgine (LAMICTAL) 200 MG tablet      2. BARBITURATES / VALPROIC ACID [Level: Moderate]   Other Orders: primidone (MYSOLINE) 50 MG tablet

## 2021-01-25 RX ORDER — LAMOTRIGINE 200 MG/1
200 TABLET ORAL 2 TIMES DAILY
Qty: 180 TABLET | Refills: 2 | Status: SHIPPED | OUTPATIENT
Start: 2021-01-25 | End: 2021-08-09

## 2021-01-25 NOTE — TELEPHONE ENCOUNTER
Last Clinic Visit: 10/26/20 recommended 6 month follow up.  Filling per [facility] medication refill protocols - seizure medications.  Not all labs required.

## 2021-02-01 DIAGNOSIS — G40.909 SEIZURE DISORDER (H): ICD-10-CM

## 2021-02-02 NOTE — TELEPHONE ENCOUNTER
Pending Prescriptions:                       Disp   Refills    primidone (MYSOLINE) 50 MG tablet [Pharma*225 ta*3            Sig: TAKE 1/2 TAB IN THE MORNING AND 1 TAB IN THE           EVENING AND 1 TAB AT BEDTIME    divalproex sodium extended-release (DEPAK*360 ta*0            Sig: Take 2 tabs in the morning and 2 tabs in the           evening (Please make 6 month follow up           appointment for April 2021 before next refill)              Thank you    Patient was scheduled for a follow up with Dr. Key for 2/8/21 @9:30 AM. Has enough pills for Depakote to bridge until next appointment, but needs a refill. Re-routed to refill team for additional refill request.

## 2021-02-03 NOTE — TELEPHONE ENCOUNTER
PRIMIDONE 50MG TABLET  Last Written Prescription Date:  1/16/2020  Last Fill Quantity: 225,   # refills: 3  Last Office Visit : 10/26/2020  Future Office visit:  2/8/2021  Routing refill request to provider for review/approval because:  Pt has appointment with Provider on 2/8/2021. Refer to Provider for review and refills per Providers recommendations for Pt care.      divalproex sodium extended-release (DEPAKOTE ER) 500 MG 24 hr tablet  Last Written Prescription Date:  1/22/2020  Last Fill Quantity: 360,   # refills: 0  Last Office Visit : 10/26/2020  Future Office visit:  2/8/2021  Routing refill request to provider for review/approval because:  Pt has appointment with Provider on 2/8/2021. Refer to Provider for review and refills per Providers recommendations for Pt care.      Ying Anthony RN  Central Triage Red Flags/Med Refills

## 2021-02-04 RX ORDER — DIVALPROEX SODIUM 500 MG/1
TABLET, EXTENDED RELEASE ORAL
Qty: 360 TABLET | Refills: 3 | Status: SHIPPED | OUTPATIENT
Start: 2021-02-04 | End: 2022-02-17

## 2021-02-04 RX ORDER — PRIMIDONE 50 MG/1
TABLET ORAL
Qty: 225 TABLET | Refills: 3 | Status: SHIPPED | OUTPATIENT
Start: 2021-02-04 | End: 2022-02-09

## 2021-02-08 ENCOUNTER — VIRTUAL VISIT (OUTPATIENT)
Dept: NEUROLOGY | Facility: CLINIC | Age: 51
End: 2021-02-08
Payer: COMMERCIAL

## 2021-02-08 DIAGNOSIS — G40.109 FOCAL EPILEPSY (H): Primary | ICD-10-CM

## 2021-02-08 NOTE — PROGRESS NOTES
Johnson is a 50 year old who is being evaluated via a billable telephone visit.      What phone number would you like to be contacted at? [phone]  How would you like to obtain your AVS? [facility] Progress [facility]      Patient:  Johnson Crystal  :  1970   Age:  50 year old   Today's Virtual Visit:  2021    Epilepsy Data:       Patient History  Primary Epileptologist/Provider: Leigh Ann Fong M.D.  Epilepsy Syndrome: Generalized Epilepsy unspecified  Age of Onset: childhood  Etiology  : Idiopathic      Tests/Surgery History  Last EE19  Last MRI: 4/10/17     Seizure Record  Current Visit Date: 10/25/20  Previous Visit Date: 20  Months since last visit: 9.3  Seizure Type 1: Tonic-clonic seizures  Description of Sz Type 1: 0  # of Type 1 Seizure since last visit: 0  Freq. Type 1 / Month: 0  Seizure Type 2: Unspecified Staring Spell  # of Type 2 Seizure since last visit: 5  Freq. Type 2 / Month: 0.54    History of Present Illness:     Mr. Johnson Crystal is participating in this virtual visit for a follow up on his seizures. He was last seen in 10/2020. He had a brief seizure on lois when he was eating.  He did not lose consciousness. He couldn't see, but he could hear. When there is a lot going on at the same time, or he does different things at once, it triggers seizures.   He is taking Depakote 1000 mg bid, Keppra 1500 mg bid and  mg bid and primidone -100. Sometimes he wakes up late and takes his morning meds late or he forgets and don't take them until afternoon.     Tremors: it's better on primidone. He feels the tremor is getting worse when he takes his medication late.     Other medical issues: He got covid in November. He had dry cough and sweating a lot. He did not have fever or other symptoms. He was sick for about couple weeks.    Current Outpatient Medications   Medication Sig Dispense Refill     amLODIPine (NORVASC) 5 MG tablet  Take 5 mg by mouth       Calcium Carb-Cholecalciferol (CALCIUM-VITAMIN D) 600-400 MG-UNIT TABS Take 1 tablet by mouth 2 times daily 180 tablet 3     diclofenac (VOLTAREN) 75 MG EC tablet Take 75 mg by mouth       divalproex sodium extended-release (DEPAKOTE ER) 500 MG 24 hr tablet Take 2 tabs in the morning and 2 tabs in the evening (Please make 6 month follow up appointment for April 2021 before next refill)    Thank you 360 tablet 3     lamoTRIgine (LAMICTAL) 200 MG tablet Take 1 tablet (200 mg) by mouth 2 times daily Please schedule a follow-up appointment for April 2021 at [phone] 180 tablet 2     levETIRAcetam (KEPPRA) 750 MG tablet Take 2 tablets (1,500 mg) by mouth 2 times daily 360 tablet 3     MELATONIN PO Take 5-10 mg by mouth       primidone (MYSOLINE) 50 MG tablet TAKE 1/2 TAB IN THE MORNING AND 1 TAB IN THE EVENING AND 1 TAB AT BEDTIME 225 tablet 3      Medication Notes:        AED Medication Compliance:  noncompliant some of the time  Using a pill box:  No    Review of Systems:  Lethargy / Tiredness:  Yes, he is not doing much at home. After he takes his daughter to school, comes back and takes a nap.  Double Vision:  yes  Sleepiness:  Yes  Poor Balance:  No  Dizziness:  After taking his medications  Respiratory: got positive for Covid in 11/2020. Had cough, no fever.    Other Issues:    Is patient safe to drive: yes, he did not have any seizures with loss of awareness. He only drives couple times a week 3-4 miles.     Assessment and Plan:   1. Idiopathic generalized epilepsy: he had 1 brief seizure wo loss of consciousness since last visit.        2. Tremors: primidone helps, if he doesn't take it, he starts to shake.      - Continue Depakote 1000 mg bid, Keppra 1500 mg bid and  mg bid  - Obtain ASD levels  - Continue primidone -100  - Follow up in 6 months      As described above, I met with the patient for 26 minutes and during this time counseling was greater than 50% of the visit  time.  Leigh Ann Fong MD

## 2021-02-08 NOTE — LETTER
2021       RE: Johnson Crystal  : 1970   MRN: [id number]      Dear Colleague,    Thank you for referring your patient, Johnson Crystal, to the [facility] EPILEPSY CARE at [facility]. Please see a copy of my visit note below.    Johnson is a 50 year old who is being evaluated via a billable telephone visit.      What phone number would you like to be contacted at? [phone]  How would you like to obtain your AVS? No     [facility] Epilepsy Care Progress Note      Patient:  Johnson Crystal  :  1970   Age:  50 year old   Today's Virtual Visit:  2021    Epilepsy Data:       Patient History  Primary Epileptologist/Provider: Leigh Ann Fong M.D.  Epilepsy Syndrome: Generalized Epilepsy unspecified  Age of Onset: childhood  Etiology  : Idiopathic      Tests/Surgery History  Last EE19  Last MRI: 4/10/17     Seizure Record  Current Visit Date: 10/25/20  Previous Visit Date: 20  Months since last visit: 9.3  Seizure Type 1: Tonic-clonic seizures  Description of Sz Type 1: 0  # of Type 1 Seizure since last visit: 0  Freq. Type 1 / Month: 0  Seizure Type 2: Unspecified Staring Spell  # of Type 2 Seizure since last visit: 5  Freq. Type 2 / Month: 0.54    History of Present Illness:     Mr. Johnson Crystal is participating in this virtual visit for a follow up on his seizures. He was last seen in 10/2020. He had a brief seizure on lois when he was eating.  He did not lose consciousness. He couldn't see, but he could hear. When there is a lot going on at the same time, or he does different things at once, it triggers seizures.   He is taking Depakote 1000 mg bid, Keppra 1500 mg bid and  mg bid and primidone -100. Sometimes he wakes up late and takes his morning meds late or he forgets and don't take them until afternoon.     Tremors: it's better on primidone. He feels the tremor is getting worse when he takes his  medication late.     Other medical issues: He got covid in November. He had dry cough and sweating a lot. He did not have fever or other symptoms. He was sick for about couple weeks.    Current Outpatient Medications   Medication Sig Dispense Refill     amLODIPine (NORVASC) 5 MG tablet Take 5 mg by mouth       Calcium Carb-Cholecalciferol (CALCIUM-VITAMIN D) 600-400 MG-UNIT TABS Take 1 tablet by mouth 2 times daily 180 tablet 3     diclofenac (VOLTAREN) 75 MG EC tablet Take 75 mg by mouth       divalproex sodium extended-release (DEPAKOTE ER) 500 MG 24 hr tablet Take 2 tabs in the morning and 2 tabs in the evening (Please make 6 month follow up appointment for April 2021 before next refill)    Thank you 360 tablet 3     lamoTRIgine (LAMICTAL) 200 MG tablet Take 1 tablet (200 mg) by mouth 2 times daily Please schedule a follow-up appointment for April 2021 at [phone] 180 tablet 2     levETIRAcetam (KEPPRA) 750 MG tablet Take 2 tablets (1,500 mg) by mouth 2 times daily 360 tablet 3     MELATONIN PO Take 5-10 mg by mouth       primidone (MYSOLINE) 50 MG tablet TAKE 1/2 TAB IN THE MORNING AND 1 TAB IN THE EVENING AND 1 TAB AT BEDTIME 225 tablet 3      Medication Notes:        AED Medication Compliance:  noncompliant some of the time  Using a pill box:  No    Review of Systems:  Lethargy / Tiredness:  Yes, he is not doing much at home. After he takes his daughter to school, comes back and takes a nap.  Double Vision:  yes  Sleepiness:  Yes  Poor Balance:  No  Dizziness:  After taking his medications  Respiratory: got positive for Covid in 11/2020. Had cough, no fever.    Other Issues:    Is patient safe to drive: yes, he did not have any seizures with loss of awareness. He only drives couple times a week 3-4 miles.     Assessment and Plan:   1. Idiopathic generalized epilepsy: he had 1 brief seizure wo loss of consciousness since last visit.        2. Tremors: primidone helps, if he doesn't take it, he starts to  shake.      - Continue Depakote 1000 mg bid, Keppra 1500 mg bid and  mg bid  - Obtain ASD levels  - Continue primidone -100  - Follow up in 6 months      As described above, I met with the patient for 26 minutes and during this time counseling was greater than 50% of the visit time.  Leigh Ann Fong MD                      Left a message to call back regarding medication and pharmacy questions before phone appointment.       Again, thank you for allowing me to participate in the care of your patient.      Sincerely,    Leigh Ann Fong MD

## 2021-02-08 NOTE — PROGRESS NOTES
Left a message to call back regarding medication and pharmacy questions before phone appointment.

## 2021-08-09 ENCOUNTER — VIRTUAL VISIT (OUTPATIENT)
Dept: NEUROLOGY | Facility: CLINIC | Age: 51
End: 2021-08-09

## 2021-08-09 ENCOUNTER — TELEPHONE (OUTPATIENT)
Dept: NEUROLOGY | Facility: CLINIC | Age: 51
End: 2021-08-09

## 2021-08-09 DIAGNOSIS — G40.319 INTRACTABLE GENERALIZED IDIOPATHIC EPILEPSY WITHOUT STATUS EPILEPTICUS (H): Primary | ICD-10-CM

## 2021-08-09 DIAGNOSIS — G40.909 SEIZURE DISORDER (H): ICD-10-CM

## 2021-08-09 RX ORDER — LAMOTRIGINE 200 MG/1
200 TABLET ORAL 2 TIMES DAILY
Qty: 180 TABLET | Refills: 3 | Status: SHIPPED | OUTPATIENT
Start: 2021-08-09 | End: 2022-07-29

## 2021-08-09 NOTE — TELEPHONE ENCOUNTER
What is the concern that needs to be addressed by a nurse? Pt thinks they are addicted to a drug, but isnt sure which one. Starts to have tremors whenever he is about to take his meds, or if he forgets to take meds. Feels a withdrawal without taking meds. Forgot to mention in his appt and wanted to make sure Dr. Key was aware.      May a detailed message be left on voicemail? yes    Date of last office visit: 8/9/21    Message routed to: mincep rn pool

## 2021-08-09 NOTE — CONFIDENTIAL NOTE
In the mornings he sometimes has tremors before taking his medications. His routine is that he will feel shaky, like a vibration, in the morning. He then eats breakfast and takes his medications. He asks if this is a form of addiction or withdrawal from his anti seizure medications.     I advised that this is not likely. I asked him to watch for patterns regarding when he is shaky and when it resolves in relation to eating food. He asks me to tell Dr. Key that this is occurring.

## 2021-08-09 NOTE — PROGRESS NOTES
Johnson is a 51 year old who is being evaluated via a billable telephone visit.      What phone number would you like to be contacted at? [phone]  How would you like to obtain your AVS? Mail a copy         [facility] Progress Note      Patient:  Johnson Crystal  :  1970   Age:  51 year old   Today's Virtual Visit:  2021    Epilepsy Data:    Patient History  Primary Epileptologist/Provider: Leigh Ann Fong M.D.  Epilepsy Syndrome: Generalized Epilepsy unspecified  Age of Onset: childhood  Etiology  : Idiopathic     Tests/Surgery History  Last EE19  Last MRI: 4/10/17    Seizure Record  Current Visit Date: 21  Previous Visit Date: 21  Months since last visit: 5  Seizure Type 1: Tonic-clonic seizures  Description of Sz Type 1: 0  Seizure Type 2: Unspecified Staring Spell    History of Present Illness:    Johnson is participating in this virtual visit for follow-up on his seizures.  He is not sure if he had a seizure or not; he says it is hard to tell sometimes.  He says he might have had couple seizures since last February. He feels like he is thinking about something and then doing something else and then he notices he wasn't supposed to do that.     He is taking Depakote 1000 mg bid, Keppra 1500 mg bid and  mg bid and primidone -100.    He has trouble sleeping and can't sleep more than 3 1/2-4 hrs.    Tremors are better. He is taking primidone 25-50-50.  If he takes it late he will have more tremors.    Current Outpatient Medications   Medication Sig Dispense Refill     amLODIPine (NORVASC) 5 MG tablet Take 5 mg by mouth       Calcium Carb-Cholecalciferol (CALCIUM-VITAMIN D) 600-400 MG-UNIT TABS Take 1 tablet by mouth 2 times daily 180 tablet 3     diclofenac (VOLTAREN) 75 MG EC tablet Take 75 mg by mouth       divalproex sodium extended-release (DEPAKOTE ER) 500 MG 24 hr tablet Take 2 tabs in the morning and 2 tabs in the evening (Please  make 6 month follow up appointment for April 2021 before next refill)    Thank you 360 tablet 3     lamoTRIgine (LAMICTAL) 200 MG tablet Take 1 tablet (200 mg) by mouth 2 times daily Please schedule a follow-up appointment for April 2021 at [phone] 180 tablet 2     levETIRAcetam (KEPPRA) 750 MG tablet Take 2 tablets (1,500 mg) by mouth 2 times daily 360 tablet 3     MELATONIN PO Take 5-10 mg by mouth       primidone (MYSOLINE) 50 MG tablet TAKE 1/2 TAB IN THE MORNING AND 1 TAB IN THE EVENING AND 1 TAB AT BEDTIME 225 tablet 3        Medication Notes:        AED Medication Compliance:  compliant most of the time    Other Issues:    Is patient safe to drive:  No    Assessment and Plan:    1. Idiopathic generalized epilepsy: The patient suspects he had couple seizures with impaired awareness since February.  I discussed DMV regulations and not to drive 3 months after his last episode.  I discussed increasing his Depakote by 500 mg in the evening.     2. Tremors: primidone helps, if he doesn't take it, he starts to shake.      - Increase Depakote to 7510-4743.   - Continue Keppra 1500 mg bid and  mg bid  - Obtain ASD levels  - Continue primidone -100  - Follow up in 6 months        As described above, I talked with the patient for 13 minutes and during this time counseling was greater than 50% of the visit time.  Leigh Ann Fong MD

## 2021-08-09 NOTE — LETTER
2021       RE: Johnson Crystal  : 1970   MRN: [id number]      Dear Colleague,    Thank you for referring your patient, Johnson Crystal, to the [facility] EPILEPSY CARE at [facility]. Please see a copy of my visit note below.    Johnson is a 51 year old who is being evaluated via a billable telephone visit.      What phone number would you like to be contacted at? [phone]  How would you like to obtain your AVS? Mail a copy        [facility] Progress Note      Patient:  Johnson Crystal  :  1970   Age:  51 year old   Today's Virtual Visit:  2021    Epilepsy Data:    Patient History  Primary Epileptologist/Provider: Leigh Ann Fong M.D.  Epilepsy Syndrome: Generalized Epilepsy unspecified  Age of Onset: childhood  Etiology  : Idiopathic     Tests/Surgery History  Last EE19  Last MRI: 4/10/17    Seizure Record  Current Visit Date: 21  Previous Visit Date: 21  Months since last visit: 5.98  Seizure Type 1: Tonic-clonic seizures  Description of Sz Type 1: 0  Seizure Type 2: Unspecified Staring Spell    History of Present Illness:    Johnson is participating in this virtual visit for follow-up on his seizures.  He is not sure if he had a seizure or not; he says it is hard to tell sometimes.  He says he might have had couple seizures since last February. He feels like he is thinking about something and then doing something else and then he notices he wasn't supposed to do that.     He is taking Depakote 1000 mg bid, Keppra 1500 mg bid and  mg bid and primidone -100.    He has trouble sleeping and can't sleep more than 3 1/2-4 hrs.    Tremors are better. He is taking primidone 25-50-50.  If he takes it late he will have more tremors.    Current Outpatient Medications   Medication Sig Dispense Refill     amLODIPine (NORVASC) 5 MG tablet Take 5 mg by mouth       Calcium Carb-Cholecalciferol  (CALCIUM-VITAMIN D) 600-400 MG-UNIT TABS Take 1 tablet by mouth 2 times daily 180 tablet 3     diclofenac (VOLTAREN) 75 MG EC tablet Take 75 mg by mouth       divalproex sodium extended-release (DEPAKOTE ER) 500 MG 24 hr tablet Take 2 tabs in the morning and 2 tabs in the evening (Please make 6 month follow up appointment for April 2021 before next refill)    Thank you 360 tablet 3     lamoTRIgine (LAMICTAL) 200 MG tablet Take 1 tablet (200 mg) by mouth 2 times daily Please schedule a follow-up appointment for April 2021 at [phone] 180 tablet 2     levETIRAcetam (KEPPRA) 750 MG tablet Take 2 tablets (1,500 mg) by mouth 2 times daily 360 tablet 3     MELATONIN PO Take 5-10 mg by mouth       primidone (MYSOLINE) 50 MG tablet TAKE 1/2 TAB IN THE MORNING AND 1 TAB IN THE EVENING AND 1 TAB AT BEDTIME 225 tablet 3        Medication Notes:        AED Medication Compliance:  compliant most of the time    Other Issues:    Is patient safe to drive:  No    Assessment and Plan:    1. Idiopathic generalized epilepsy: The patient suspects he had couple seizures with impaired awareness since February.  I discussed DMV regulations and not to drive 3 months after his last episode.  I discussed increasing his Depakote by 500 mg in the evening.     2. Tremors: primidone helps, if he doesn't take it, he starts to shake.      - Increase Depakote to 4883-0971.   - Continue Keppra 1500 mg bid and  mg bid  - Obtain ASD levels  - Continue primidone -100  - Follow up in 6 months        As described above, I talked with the patient for 13 minutes and during this time counseling was greater than 50% of the visit time.  Leigh Ann Fong MD                      Again, thank you for allowing me to participate in the care of your patient.      Sincerely,    Leigh Ann Fong MD

## 2021-08-11 NOTE — TELEPHONE ENCOUNTER
In the mornings he sometimes has tremors before taking his medications. His routine is that he will feel shaky, like a vibration, in the morning. He then eats breakfast and takes his medications. He asks if this is a form of addiction or withdrawal from his anti seizure medications.     I advised that this is not likely. I asked him to watch for patterns regarding when he is shaky and when it resolves in relation to eating food. He asks me to tell Dr. Key that this is occurring.       -----------ADDENDUM 8/11/2021--------    Yesi-Leigh Ann Lawson MD Vassar, Allison, RN  Caller: Unspecified (2 days ago, 11:48 AM)  Depakote can contribute to this. He is taking primidone for this. He may take it early morning to avoid early morning tremors.   Thanks,   Leigh Ann Key       This was communicated to the patient. He verbalized understanding.

## 2021-11-20 DIAGNOSIS — M81.0 SENILE OSTEOPOROSIS: ICD-10-CM

## 2021-11-22 NOTE — TELEPHONE ENCOUNTER
Calcium Carb-Cholecalciferol (CALCIUM-VITAMIN D) 600-400 MG-UNIT TABS   Last Written Prescription Date: 11/23/20  Last Fill Quantity: 180,   # refills: 3  Last Office Visit :8/9/21  Future Office visit: none

## 2021-11-30 DIAGNOSIS — G40.909 SEIZURE DISORDER (H): ICD-10-CM

## 2021-12-02 RX ORDER — LEVETIRACETAM 750 MG/1
1500 TABLET ORAL 2 TIMES DAILY
Qty: 120 TABLET | Refills: 0 | Status: SHIPPED | OUTPATIENT
Start: 2021-12-02 | End: 2022-01-24

## 2021-12-02 NOTE — TELEPHONE ENCOUNTER
Last Clinic Visit: 8/9/2021  [facility]  Recommended 6 month follow up  Prescription provided to get to next visit with message to schedule upcoming appointment for February  Under age 65 creatinine not required  Filling per [facility] medication refill protocols - seizure medications.  Not all labs required.

## 2022-01-19 DIAGNOSIS — G40.909 SEIZURE DISORDER (H): ICD-10-CM

## 2022-01-24 RX ORDER — LEVETIRACETAM 750 MG/1
TABLET ORAL
Qty: 120 TABLET | Refills: 4 | Status: SHIPPED | OUTPATIENT
Start: 2022-01-24 | End: 2022-03-10

## 2022-01-24 NOTE — TELEPHONE ENCOUNTER
LEVETIRACETAM 750MG TABLET    Last Written Prescription Date:  12/2/21  Last Fill Quantity: 120,   # refills: 0  Last Office Visit : 8/9/21  Future Office visit:  none    Routing refill request to provider for review/approval because:  2nd request  30 day shanna on 12/2/21  Overdue for 6 mo f/u appt  Message sent to schedulers  Thank-you, Lulú JAEGER RN Medication Refill Team

## 2022-01-31 DIAGNOSIS — G40.909 SEIZURE DISORDER (H): ICD-10-CM

## 2022-02-02 NOTE — TELEPHONE ENCOUNTER
Patient called in and would like to go over medication that the dosaged was upped and had questions surrounding that (Keppra or depakote) Claims he iis stil waiting on a return from the provider.

## 2022-02-03 RX ORDER — PRIMIDONE 50 MG/1
TABLET ORAL
Qty: 225 TABLET | Refills: 2 | OUTPATIENT
Start: 2022-02-03

## 2022-02-03 RX ORDER — DIVALPROEX SODIUM 500 MG/1
TABLET, EXTENDED RELEASE ORAL
Qty: 360 TABLET | Refills: 2 | OUTPATIENT
Start: 2022-02-03

## 2022-02-09 DIAGNOSIS — G40.909 SEIZURE DISORDER (H): ICD-10-CM

## 2022-02-09 RX ORDER — PRIMIDONE 50 MG/1
TABLET ORAL
Qty: 225 TABLET | Refills: 1 | Status: SHIPPED | OUTPATIENT
Start: 2022-02-09 | End: 2022-03-10

## 2022-02-09 NOTE — TELEPHONE ENCOUNTER
"Chart reviewed.  Last visit with Dr.Shams Lawson 8/9/2021 (Virtual)  Next scheduled visit 3/10/2022  From the 8/9/21 visit  \"He is taking Depakote 1000 mg bid, Keppra 1500 mg bid and  mg bid and primidone -100.     He has trouble sleeping and can't sleep more than 3 1/2-4 hrs.     Tremors are better. He is taking primidone 25-50-50.  If he takes it late he will have more tremors\"    Last prescription was sent 2/4/2021- \"  primidone (MYSOLINE) 50 MG tablet 225 tablet 3 2/4/2021  No   Sig: TAKE 1/2 TAB IN THE MORNING AND 1 TAB IN THE EVENING AND 1 TAB AT BEDTIME   Sent to pharmacy as: Primidone 50 MG Oral Tablet (MYSOLINE)   Class: E-Prescribe   Order: [id number]   E-Prescribing Status: Receipt confirmed by pharmacy (2/4/2021  1:05 PM CST)     \"      Will route to MD for recommendations  "

## 2022-02-16 DIAGNOSIS — G40.909 SEIZURE DISORDER (H): ICD-10-CM

## 2022-02-17 RX ORDER — DIVALPROEX SODIUM 500 MG/1
TABLET, EXTENDED RELEASE ORAL
Qty: 360 TABLET | Refills: 3 | Status: SHIPPED | OUTPATIENT
Start: 2022-02-17 | End: 2022-03-10

## 2022-03-10 ENCOUNTER — VIRTUAL VISIT (OUTPATIENT)
Dept: NEUROLOGY | Facility: CLINIC | Age: 52
End: 2022-03-10
Payer: COMMERCIAL

## 2022-03-10 DIAGNOSIS — G40.319 INTRACTABLE GENERALIZED IDIOPATHIC EPILEPSY WITHOUT STATUS EPILEPTICUS (H): Primary | ICD-10-CM

## 2022-03-10 DIAGNOSIS — G40.909 SEIZURE DISORDER (H): ICD-10-CM

## 2022-03-10 RX ORDER — PRIMIDONE 50 MG/1
TABLET ORAL
Qty: 225 TABLET | Refills: 3 | Status: SHIPPED | OUTPATIENT
Start: 2022-03-10 | End: 2023-03-28

## 2022-03-10 RX ORDER — LEVETIRACETAM 750 MG/1
TABLET ORAL
Qty: 360 TABLET | Refills: 3 | Status: SHIPPED | OUTPATIENT
Start: 2022-03-10 | End: 2023-03-08

## 2022-03-10 RX ORDER — DIVALPROEX SODIUM 500 MG/1
TABLET, EXTENDED RELEASE ORAL
Qty: 450 TABLET | Refills: 1 | Status: SHIPPED | OUTPATIENT
Start: 2022-03-10 | End: 2022-09-08

## 2022-03-10 NOTE — LETTER
3/10/2022       RE: Johnson Crystal  : 1970   MRN: [id number]      Dear Colleague,    Thank you for referring your patient, Johnson Crystal, to the [facility] EPILEPSY CARE at [facility]. Please see a copy of my visit note below.      Johnson is a 51 year old who is being evaluated via a billable video visit.      How would you like to obtain your AVS? Mail a copy  If the video visit is dropped, the   if doesnt connect call direct at [phone], Will anyone else be joining your video visit? Yes: wife. How would they like to receive their invitation? Text to cell phone: [phone]      Video Start Time: 10:34  Video-Visit Details    Type of service:  Video Visit    Video End Time:11:01    Originating Location (pt. Location): Home    Distant Location (provider location):  [facility] EPILEPSY [facility]ARE     Platform used for Video Visit: [facility]psy [facility] Progress Note      Patient:  Johnson Crystal  :  1970   Age:  51 year old   Today's Virtual Visit:  3/10/2022    Epilepsy Data:    Patient History  Primary Epileptologist/Provider: Leigh Ann Fong M.D.  Epilepsy Syndrome: Generalized Epilepsy unspecified  Age of Onset: childhood  Etiology  : Idiopathic     Tests/Surgery History  Last EE19  Last MRI: 4/10/17    Seizure Record  Current Visit Date: 03/10/22  Previous Visit Date: 21  Months since last visit: 7  Seizure Type 1: Tonic-clonic seizures  Description of Sz Type 1: 0  Seizure Type 2: Unspecified Staring Spell    History of Present Illness:     Johnson is participating in this virtual visit for a follow up on his epilepsy. He was last seen 2021.  He hasn't been documenting his seizures, he thinks he might have had one or 2 seizures. He says they are brief, couple seconds. He describes them as losing the track of conversations.  He forgot to increase his Depakote at night.   He hasn't done the blood work.     He is  taking Depakote 4146-0099, Keppra 1500 mg bid and  mg bid and primidone -100.    Current Outpatient Medications   Medication Sig Dispense Refill     amLODIPine (NORVASC) 5 MG tablet Take 5 mg by mouth       Calcium Carb-Cholecalciferol (CALCIUM CARBONATE-VITAMIN D3) 600-400 MG-UNIT TABS Take 1 tablet by mouth 2 times daily 180 tablet 2     diclofenac (VOLTAREN) 75 MG EC tablet Take 75 mg by mouth       divalproex sodium extended-release (DEPAKOTE ER) 500 MG 24 hr tablet Take 2 tabs in the morning and 2 tabs in the evening (Please make 6 month follow up appointment for April 2021 before next refill)    Thank you 360 tablet 3     lamoTRIgine (LAMICTAL) 200 MG tablet Take 1 tablet (200 mg) by mouth 2 times daily Please schedule a follow-up appointment for April 2021 at [phone] 180 tablet 3     levETIRAcetam (KEPPRA) 750 MG tablet Take 2 tabs  tablet 4     MELATONIN PO Take 5-10 mg by mouth       primidone (MYSOLINE) 50 MG tablet TAKE 1/2 TAB IN THE MORNING AND 1 TAB IN THE EVENING AND 1 TAB AT BEDTIME 225 tablet 1      Medication Notes:    AED Side Effects Discussed: Yes   AED Medication Compliance:  compliant most of the time    Other Issues:    Is patient safe to drive:  No    Exam:    Wt Readings from Last 5 Encounters:   01/16/20 230 lb 3.2 oz (104.4 kg)   07/15/19 224 lb 11.2 oz (101.9 kg)   01/07/19 224 lb (101.6 kg)   08/30/18 210 lb (95.3 kg)   02/05/18 214 lb (97.1 kg)     Alert, awake, NAD, no aphasia or dysarthria, EOMI, face symmetric, moves upper extremities against gravity. Normal finger to nose.     Assessment and Plan:   1. Idiopathic generalized epilepsy: the patient hasn't documented his seizures, he thinks he might have had one or two brief seizures. I advised him to document his seizures every time they happen so he doesn't forget. He forgot to increase his Depakote last time. I advised him to increase it by 500 mg in the evening.      2. Tremors: primidone helps, his  tremors have improved on primidone.      - Increase Depakote to 3951-6204.   - Continue Keppra 1500 mg bid and  mg bid  - Obtain ASD levels, CBC and CMP  - Continue primidone -100  - Follow up in 6 months        As described above, I met with the patient for 26 minutes and during this time counseling was greater than 50% of the visit time.  Leigh Ann Fong MD                  Again, thank you for allowing me to participate in the care of your patient.      Sincerely,    Leigh Ann Fong MD

## 2022-03-10 NOTE — PROGRESS NOTES
Johnson is a 51 year old who is being evaluated via a billable video visit.      How would you like to obtain your AVS? Mail a copy  If the video visit is dropped, the   if doesnt connect call direct at [phone], Will anyone else be joining your video visit? Yes: wife. How would they like to receive their invitation? Text to cell phone: [phone]      Video Start Time: 10:34  Video-Visit Details    Type of service:  Video Visit    Video End Time:11:01    Originating Location (pt. Location): Home    Distant Location (provider location):  [facility]     Platform used for Video Visit: [facility] Progress Note      Patient:  Johnson Crystal  :  1970   Age:  51 year old   Today's Virtual Visit:  3/10/2022    Epilepsy Data:    Patient History  Primary Epileptologist/Provider: Leigh Ann Fong M.D.  Epilepsy Syndrome: Generalized Epilepsy unspecified  Age of Onset: childhood  Etiology  : Idiopathic     Tests/Surgery History  Last EE19  Last MRI: 4/10/17    Seizure Record  Current Visit Date: 03/10/22  Previous Visit Date: 21  Months since last visit: 7  Seizure Type 1: Tonic-clonic seizures  Description of Sz Type 1: 0  Seizure Type 2: Unspecified Staring Spell    History of Present Illness:     Johnson is participating in this virtual visit for a follow up on his epilepsy. He was last seen 2021.  He hasn't been documenting his seizures, he thinks he might have had one or 2 seizures. He says they are brief, couple seconds. He describes them as losing the track of conversations.  He forgot to increase his Depakote at night.   He hasn't done the blood work.     He is taking Depakote 6677-4008, Keppra 1500 mg bid and  mg bid and primidone -100.    Current Outpatient Medications   Medication Sig Dispense Refill     amLODIPine (NORVASC) 5 MG tablet Take 5 mg by mouth       Calcium Carb-Cholecalciferol (CALCIUM CARBONATE-VITAMIN D3)  600-400 MG-UNIT TABS Take 1 tablet by mouth 2 times daily 180 tablet 2     diclofenac (VOLTAREN) 75 MG EC tablet Take 75 mg by mouth       divalproex sodium extended-release (DEPAKOTE ER) 500 MG 24 hr tablet Take 2 tabs in the morning and 2 tabs in the evening (Please make 6 month follow up appointment for April 2021 before next refill)    Thank you 360 tablet 3     lamoTRIgine (LAMICTAL) 200 MG tablet Take 1 tablet (200 mg) by mouth 2 times daily Please schedule a follow-up appointment for April 2021 at [phone] 180 tablet 3     levETIRAcetam (KEPPRA) 750 MG tablet Take 2 tabs  tablet 4     MELATONIN PO Take 5-10 mg by mouth       primidone (MYSOLINE) 50 MG tablet TAKE 1/2 TAB IN THE MORNING AND 1 TAB IN THE EVENING AND 1 TAB AT BEDTIME 225 tablet 1      Medication Notes:    AED Side Effects Discussed: Yes   AED Medication Compliance:  compliant most of the time    Other Issues:    Is patient safe to drive:  No    Exam:    Wt Readings from Last 5 Encounters:   01/16/20 230 lb 3.2 oz (104.4 kg)   07/15/19 224 lb 11.2 oz (101.9 kg)   01/07/19 224 lb (101.6 kg)   08/30/18 210 lb (95.3 kg)   02/05/18 214 lb (97.1 kg)     Alert, awake, NAD, no aphasia or dysarthria, EOMI, face symmetric, moves upper extremities against gravity. Normal finger to nose.     Assessment and Plan:   1. Idiopathic generalized epilepsy: the patient hasn't documented his seizures, he thinks he might have had one or two brief seizures. I advised him to document his seizures every time they happen so he doesn't forget. He forgot to increase his Depakote last time. I advised him to increase it by 500 mg in the evening.      2. Tremors: primidone helps, his tremors have improved on primidone.      - Increase Depakote to 4915-8109.   - Continue Keppra 1500 mg bid and  mg bid  - Obtain ASD levels, CBC and CMP  - Continue primidone -100  - Follow up in 6 months        As described above, I met with the patient for 26 minutes and  during this time counseling was greater than 50% of the visit time.  Leigh Ann Fong MD

## 2022-05-19 ENCOUNTER — MEDICAL CORRESPONDENCE (OUTPATIENT)
Dept: HEALTH INFORMATION MANAGEMENT | Facility: CLINIC | Age: 52
End: 2022-05-19

## 2022-07-28 DIAGNOSIS — G40.909 SEIZURE DISORDER (H): ICD-10-CM

## 2022-07-29 RX ORDER — LAMOTRIGINE 200 MG/1
200 TABLET ORAL 2 TIMES DAILY
Qty: 180 TABLET | Refills: 3 | Status: SHIPPED | OUTPATIENT
Start: 2022-07-29 | End: 2023-04-28

## 2022-07-29 NOTE — TELEPHONE ENCOUNTER
LAMOTRIGINE 200MG TABLET  Last Written Prescription Date:   8/9/2021  Last Fill Quantity: 180,   # refills: 3  Last Office Visit :  3/10/2022  Future Office visit:   9/8/2022  180 Tabs, 3 refills sent to delmer Anthony RN  Central Triage Red Flags/Med Refills        Warnings Override History for lamoTRIgine (LAMICTAL) 200 MG tablet [[id number]]    Overridden by Leigh Ann Fong MD on Mar 10, 2022 10:49 AM   Drug-Drug   1. VALPROIC ACID / LAMOTRIGINE [Level: Major]   Other Orders: divalproex sodium extended-release (DEPAKOTE ER) 500 MG 24 hr tablet         Overridden by Doege, Kathleen M, RN on Feb 17, 2022 4:21 PM   Drug-Drug   1. VALPROIC ACID / LAMOTRIGINE [Level: Major]   Other Orders: divalproex sodium extended-release (DEPAKOTE ER) 500 MG 24 hr tablet         Overridden by Leigh Ann Fong MD on Aug 9, 2021 11:23 AM   Drug-Drug   1. VALPROIC ACID / LAMOTRIGINE [Level: Major]   Other Orders: divalproex sodium extended-release (DEPAKOTE ER) 500 MG 24 hr tablet

## 2022-08-27 DIAGNOSIS — M81.0 SENILE OSTEOPOROSIS: Primary | ICD-10-CM

## 2022-09-08 ENCOUNTER — VIRTUAL VISIT (OUTPATIENT)
Dept: NEUROLOGY | Facility: CLINIC | Age: 52
End: 2022-09-08
Payer: COMMERCIAL

## 2022-09-08 VITALS — WEIGHT: 245 LBS | BODY MASS INDEX: 34.3 KG/M2 | HEIGHT: 71 IN

## 2022-09-08 DIAGNOSIS — G40.909 SEIZURE DISORDER (H): ICD-10-CM

## 2022-09-08 RX ORDER — DIVALPROEX SODIUM 500 MG/1
TABLET, EXTENDED RELEASE ORAL
Qty: 450 TABLET | Refills: 3 | Status: SHIPPED | OUTPATIENT
Start: 2022-09-08 | End: 2023-10-27

## 2022-09-08 ASSESSMENT — PAIN SCALES - GENERAL: PAINLEVEL: NO PAIN (0)

## 2022-09-08 ASSESSMENT — PATIENT HEALTH QUESTIONNAIRE - PHQ9: SUM OF ALL RESPONSES TO PHQ QUESTIONS 1-9: 16

## 2022-09-08 NOTE — LETTER
2022       RE: Johnson Crystal  : 1970   MRN: [id number]      Dear Colleague,    Thank you for referring your patient, Johnson Crystal, to the [facility] EPILEPSY CARE at [facility]. Please see a copy of my visit note below.    Johnson is a 52 year old who is being evaluated via a billable video visit.      How would you like to obtain your AVS? MyChart  If the video visit is dropped, the invitation should be resent by: Text to cell phone: [phone]  Will anyone else be joining your video visit? No       ANATOLY Ceja    Depression Response    Patient completed the PHQ-9 assessment for depression and scored >9? Yes  Question 9 on the PHQ-9 was positive for suicidality? No  Does patient have current mental health provider? No    Is this a virtual visit? Yes   Does patient have suicidal ideation (positive question 9)? No     Video-Visit Details  Video did not work for the patient. Appointment was switched to a telephone call.    [facility] Epilepsy Care Progress Note      Patient:  Johnson Crystal  :  1970   Age:  52 year old   Today's Virtual Visit:  2022    Epilepsy Data:    Patient History  Primary Epileptologist/Provider: Leigh Ann Fong M.D.  Epilepsy Syndrome: Generalized Epilepsy unspecified  Age of Onset: childhood  Etiology: Idiopathic     Tests/Surgery History  Last EE19  Last MRI: 4/10/17    Seizure Record  Current Visit Date: 22  Previous Visit Date: 03/10/22  Months since last visit: 5.98  Seizure Type 1: Tonic-clonic seizures  Description of Sz Type 1: 0  Seizure Type 2: Unspecified Staring Spell    History of Present Illness:     Johnson is participating in this virtual visit for a follow up on his epilepsy.  He was last seen 3/2022. He did not have any seizures that he or his wife are aware of.      He is taking Depakote 3236-0533, Keppra 1500 mg bid and  mg bid and primidone -100.  (mainly for tremors). He feels tired after taking medications.     His tremors are better on primidone. He is taking  -100.    Depression: he feels down because he is not working. He doesn't see a therapist or psychiatrist. He has a lot of family stressor. His dad is blind and his brother is quadriplegic, so he needs to help his mom. He denies suicidal ideation.     ASD levels 5/13/22:   VPA level: 78  LVT level: 39.5  Lamotrigine level: 14.6      Current Outpatient Medications   Medication Sig Dispense Refill     amLODIPine (NORVASC) 5 MG tablet Take 5 mg by mouth       Calcium Carb-Cholecalciferol (CALCIUM CARBONATE-VITAMIN D3) 600-400 MG-UNIT TABS Take 1 tablet by mouth 2 times daily 180 tablet 3     diclofenac (VOLTAREN) 75 MG EC tablet Take 75 mg by mouth       divalproex sodium extended-release (DEPAKOTE ER) 500 MG 24 hr tablet Take 2 tabs in the morning and 3 tabs in the evening 450 tablet 1     lamoTRIgine (LAMICTAL) 200 MG tablet Take 1 tablet (200 mg) by mouth 2 times daily 180 tablet 3     levETIRAcetam (KEPPRA) 750 MG tablet Take 2 tabs  tablet 3     primidone (MYSOLINE) 50 MG tablet TAKE 1/2 TAB IN THE MORNING AND 1 TAB IN THE EVENING AND 1 TAB AT BEDTIME 225 tablet 3     MELATONIN PO Take 5-10 mg by mouth (Patient not taking: Reported on 9/8/2022)        Assessment and Plan:   1. Idiopathic generalized epilepsy: the patient did not have any seizures that he or his wife are aware of.  seizures are controlled on the current ASDs.      2. Tremors: primidone helps, his tremors have improved on primidone.     3.  Depression: The patient scored 16 on PHQ9. He reports family stressors and not working as causes for his depression.  I advised him to see a psychiatrist.     - Continue Depakote 3431-7341,  Keppra 1500 mg bid and  mg bid  - Continue primidone -100  - Referral for mental health care  - Follow up in 6 months        As described above, I talked with the patient for 23  minutes and during this time counseling was greater than 50% of the visit time.   Leigh Ann Fong MD                        Again, thank you for allowing me to participate in the care of your patient.      Sincerely,    Leigh Ann Fong MD

## 2022-09-08 NOTE — PROGRESS NOTES
Johnson is a 52 year old who is being evaluated via a billable video visit.      How would you like to obtain your AVS? MyChart  If the video visit is dropped, the invitation should be resent by: Text to cell phone: [phone]  Will anyone else be joining your video visit? No       ANATOLY Ceja    Depression Response    Patient completed the PHQ-9 assessment for depression and scored >9? Yes  Question 9 on the PHQ-9 was positive for suicidality? No  Does patient have current mental health provider? No    Is this a virtual visit? Yes   Does patient have suicidal ideation (positive question 9)? No     Video-Visit Details  Video did not work for the patient. Appointment was switched to a telephone call.    [facility] Epilepsy Care Progress Note      Patient:  Johnson Crystal  :  1970   Age:  52 year old   Today's Virtual Visit:  2022    Epilepsy Data:    Patient History  Primary Epileptologist/Provider: Leigh Ann Fong M.D.  Epilepsy Syndrome: Generalized Epilepsy unspecified  Age of Onset: childhood  Etiology: Idiopathic     Tests/Surgery History  Last EE19  Last MRI: 4/10/17    Seizure Record  Current Visit Date: 22  Previous Visit Date: 03/10/22  Months since last visit: 5.98  Seizure Type 1: Tonic-clonic seizures  Description of Sz Type 1: 0  Seizure Type 2: Unspecified Staring Spell    History of Present Illness:     Johnson is participating in this virtual visit for a follow up on his epilepsy.  He was last seen 3/2022. He did not have any seizures that he or his wife are aware of.      He is taking Depakote [id number], Keppra 1500 mg bid and  mg bid and primidone -100. (mainly for tremors). He feels tired after taking medications.     His tremors are better on primidone. He is taking  -100.    Depression: he feels down because he is not working. He doesn't see a therapist or psychiatrist. He has a lot of family stressor. His dad is blind and his  brother is quadriplegic, so he needs to help his mom. He denies suicidal ideation.     ASD levels 5/13/22:   VPA level: 78  LVT level: 39.5  Lamotrigine level: 14.6      Current Outpatient Medications   Medication Sig Dispense Refill     amLODIPine (NORVASC) 5 MG tablet Take 5 mg by mouth       Calcium Carb-Cholecalciferol (CALCIUM CARBONATE-VITAMIN D3) 600-400 MG-UNIT TABS Take 1 tablet by mouth 2 times daily 180 tablet 3     diclofenac (VOLTAREN) 75 MG EC tablet Take 75 mg by mouth       divalproex sodium extended-release (DEPAKOTE ER) 500 MG 24 hr tablet Take 2 tabs in the morning and 3 tabs in the evening 450 tablet 1     lamoTRIgine (LAMICTAL) 200 MG tablet Take 1 tablet (200 mg) by mouth 2 times daily 180 tablet 3     levETIRAcetam (KEPPRA) 750 MG tablet Take 2 tabs  tablet 3     primidone (MYSOLINE) 50 MG tablet TAKE 1/2 TAB IN THE MORNING AND 1 TAB IN THE EVENING AND 1 TAB AT BEDTIME 225 tablet 3     MELATONIN PO Take 5-10 mg by mouth (Patient not taking: Reported on 9/8/2022)        Assessment and Plan:   1. Idiopathic generalized epilepsy: the patient did not have any seizures that he or his wife are aware of.  seizures are controlled on the current ASDs.      2. Tremors: primidone helps, his tremors have improved on primidone.     3.  Depression: The patient scored 16 on PHQ9. He reports family stressors and not working as causes for his depression.  I advised him to see a psychiatrist.     - Continue Depakote [id number],  Keppra 1500 mg bid and  mg bid  - Continue primidone -100  - Referral for mental health care  - Follow up in 6 months        As described above, I talked with the patient for 23 minutes and during this time counseling was greater than 50% of the visit time.   Leigh Ann Fong MD

## 2022-09-30 ENCOUNTER — VIRTUAL VISIT (OUTPATIENT)
Dept: NEUROLOGY | Facility: CLINIC | Age: 52
End: 2022-09-30
Payer: COMMERCIAL

## 2022-09-30 DIAGNOSIS — G40.319 INTRACTABLE GENERALIZED IDIOPATHIC EPILEPSY WITHOUT STATUS EPILEPTICUS (H): Primary | ICD-10-CM

## 2022-09-30 PROCEDURE — 99212 OFFICE O/P EST SF 10 MIN: CPT | Mod: 95 | Performed by: PSYCHIATRY & NEUROLOGY

## 2022-09-30 NOTE — PROGRESS NOTES
Johnson is a 52 year old who is being evaluated via a billable video visit.      How would you like to obtain your AVS? Mail a copy  If the video visit is dropped, the invitation should be resent by: Text to cell phone: [phone]  Will anyone else be joining your video visit? No        Video-Visit Details    Video Start Time: 11:06    Type of service:  Video Visit    Video End Time: 11:24    Originating Location (pt. Location): Home    Distant Location (provider location):  [facility] NEUROLOGY CLINIC [place]     Platform used for Video Visit: [facility]      NEUROLOGY PROGRESS NOTE   [facility]    Patient:Johnson Crystal  : 1970  Age: 52 year old  Today's Virtual Visit: 2022    Epilepsy Data:  Patient History  Primary Epileptologist/Provider: Leigh Ann Fong M.D.  Epilepsy Syndrome: Generalized Epilepsy unspecified  Age of Onset: childhood  Etiology: Idiopathic      Tests/Surgery History  Last EE19  Last MRI: 4/10/17     Seizure Record  Current Visit Date: 22  Previous Visit Date: 03/10/22  Months since last visit: 5.98  Seizure Type 1: Tonic-clonic seizures  Description of Sz Type 1: 0  Seizure Type 2: Unspecified Staring Spell    History of present illness:     Mr. Crystal has requested this visit for filling out his disability form.  He did not have any seizures since last visit.    He says he has a disability form that needs to be filled out every year.      He is taking Depakote [id number], Keppra 1500 mg bid and  mg bid and primidone -100. (mainly for tremors). He feels tired after taking medications.     Current Outpatient Medications   Medication Sig Dispense Refill     amLODIPine (NORVASC) 5 MG tablet Take 5 mg by mouth       Calcium Carb-Cholecalciferol (CALCIUM CARBONATE-VITAMIN D3) 600-400 MG-UNIT TABS Take 1 tablet by mouth 2 times daily 180 tablet 3     diclofenac (VOLTAREN) 75 MG EC tablet Take 75 mg by mouth       divalproex sodium  extended-release (DEPAKOTE ER) 500 MG 24 hr tablet Take 2 tabs in the morning and 3 tabs in the evening 450 tablet 3     lamoTRIgine (LAMICTAL) 200 MG tablet Take 1 tablet (200 mg) by mouth 2 times daily 180 tablet 3     levETIRAcetam (KEPPRA) 750 MG tablet Take 2 tabs  tablet 3     MELATONIN PO Take 5-10 mg by mouth       primidone (MYSOLINE) 50 MG tablet TAKE 1/2 TAB IN THE MORNING AND 1 TAB IN THE EVENING AND 1 TAB AT BEDTIME 225 tablet 3     Exam:    Wt Readings from Last 5 Encounters:   09/08/22 111.1 kg (245 lb)   01/16/20 104.4 kg (230 lb 3.2 oz)   07/15/19 101.9 kg (224 lb 11.2 oz)   01/07/19 101.6 kg (224 lb)   08/30/18 95.3 kg (210 lb)     Alert, awake, NAD, no aphasia or dysarthria, EOMI, face symmetric, moves his upper extremities against gravity.     Assessment/Plan:     1. Idiopathic generalized epilepsy: the patient did not have any seizures that he or his wife are aware of.  Seizures are controlled on the current ASDs. He needs his disability form to be filled out.  I instructed him to send the form to the clinic (at [facility]) to be filled out.      2.  Depression: The patient scored 16 on PHQ9 last visit.  Last time I did put a referral for psychiatry, however, he hasn't made an appointment. I advised him to make the appointment.       - Continue Depakote 5214-7129, Keppra 1500 mg bid and  mg bid  - Continue primidone -100  - Referral for psychiatry placed  - Follow up in 6 months      As described above, I met with the patient for 17 minutes and during this time counseling was greater than 50% of the visit time.  Leigh Ann Fong MD

## 2022-09-30 NOTE — LETTER
2022         RE: Johnson Crystal  [address]  [place] [address]        Dear Colleague,    Thank you for referring your patient, Johnson Crystal, to the [facility] NEUROLOGY CLINIC [place]. Please see a copy of my visit note below.       NEUROLOGY PROGRESS NOTE   [facility]    Patient:Johnson Crystal  : 1970  Age: 52 year old  Today's Virtual Visit: 2022    Epilepsy Data:  Patient History  Primary Epileptologist/Provider: Leigh Ann Fong M.D.  Epilepsy Syndrome: Generalized Epilepsy unspecified  Age of Onset: childhood  Etiology: Idiopathic      Tests/Surgery History  Last EE19  Last MRI: 4/10/17     Seizure Record  Current Visit Date: 22  Previous Visit Date: 03/10/22  Months since last visit:   Seizure Type 1: Tonic-clonic seizures  Description of Sz Type 1: 0  Seizure Type 2: Unspecified Staring Spell    History of present illness:     Mr. Crystal has requested this visit for filling out his disability form.  He did not have any seizures since last visit.    He says he has a disability form that needs to be filled out every year.      He is taking Depakote [id number], Keppra 1500 mg bid and  mg bid and primidone -100. (mainly for tremors). He feels tired after taking medications.     Current Outpatient Medications   Medication Sig Dispense Refill     amLODIPine (NORVASC) 5 MG tablet Take 5 mg by mouth       Calcium Carb-Cholecalciferol (CALCIUM CARBONATE-VITAMIN D3) 600-400 MG-UNIT TABS Take 1 tablet by mouth 2 times daily 180 tablet 3     diclofenac (VOLTAREN) 75 MG EC tablet Take 75 mg by mouth       divalproex sodium extended-release (DEPAKOTE ER) 500 MG 24 hr tablet Take 2 tabs in the morning and 3 tabs in the evening 450 tablet 3     lamoTRIgine (LAMICTAL) 200 MG tablet Take 1 tablet (200 mg) by mouth 2 times daily 180 tablet 3     levETIRAcetam (KEPPRA) 750 MG tablet Take 2 tabs  tablet 3     MELATONIN PO Take 5-10  mg by mouth       primidone (MYSOLINE) 50 MG tablet TAKE 1/2 TAB IN THE MORNING AND 1 TAB IN THE EVENING AND 1 TAB AT BEDTIME 225 tablet 3     Exam:    Wt Readings from Last 5 Encounters:   09/08/22 111.1 kg (245 lb)   01/16/20 104.4 kg (230 lb 3.2 oz)   07/15/19 101.9 kg (224 lb 11.2 oz)   01/07/19 101.6 kg (224 lb)   08/30/18 95.3 kg (210 lb)     Alert, awake, NAD, no aphasia or dysarthria, EOMI, face symmetric, moves his upper extremities against gravity.     Assessment/Plan:     1. Idiopathic generalized epilepsy: the patient did not have any seizures that he or his wife are aware of.  Seizures are controlled on the current ASDs. He needs his disability form to be filled out.  I instructed him to send the form to the clinic (at [facility]) to be filled out.      2.  Depression: The patient scored 16 on PHQ9 last visit.  Last time I did put a referral for psychiatry, however, he hasn't made an appointment. I advised him to make the appointment.       - Continue Depakote [id number], Keppra 1500 mg bid and  mg bid  - Continue primidone -100  - Referral for psychiatry placed  - Follow up in 6 months      As described above, I met with the patient for 17 minutes and during this time counseling was greater than 50% of the visit time.  Leigh Ann Fong MD

## 2022-10-04 ENCOUNTER — TELEPHONE (OUTPATIENT)
Dept: NEUROLOGY | Facility: CLINIC | Age: 52
End: 2022-10-04

## 2022-10-04 NOTE — TELEPHONE ENCOUNTER
Left Voicemail (1st Attempt) for the patient to call back and schedule the following:    Appointment type: EEG  Provider: Dr. Key  Return date: TBD  Specialty phone number: [phone]  Additional appointment(s) needed: yes  Additonal Notes: Patient needs to schedule EEG at [facility] or the [facility]. He also needs to schedule a 6 month follow up with Dr. Key.    [facility]   Neurology, NeuroSurgery, NeuroPsychology and Pain Management Specialties  [phone]

## 2022-10-05 ENCOUNTER — TELEPHONE (OUTPATIENT)
Dept: NEUROLOGY | Facility: CLINIC | Age: 52
End: 2022-10-05

## 2022-10-05 NOTE — TELEPHONE ENCOUNTER
Patient received call to schedule EEG? I don't see any note from recent visits about EEG and no recent order entered for one. There is a very old order from 2019 still in chart. Does Dr. Key want new EEG done?

## 2022-10-06 NOTE — TELEPHONE ENCOUNTER
[facility] Call Center    Phone Message    May a detailed message be left on voicemail: yes     Reason for Call: Other: Pt is calling because someone called him to schedule an EEG. There is no order in the chart. If EEG is needed, an order needs to be placed. Please advise     Action Taken: Message routed to:  Adult Clinics: Neurology p [id number]    Travel Screening: Not Applicable

## 2023-03-04 DIAGNOSIS — G40.909 SEIZURE DISORDER (H): Primary | ICD-10-CM

## 2023-03-08 RX ORDER — LEVETIRACETAM 750 MG/1
TABLET ORAL
Qty: 120 TABLET | Refills: 0 | Status: SHIPPED | OUTPATIENT
Start: 2023-03-08 | End: 2023-03-16

## 2023-03-08 NOTE — TELEPHONE ENCOUNTER
Health Maintenance Due   Topic Date Due   • Influenza Vaccine (1) 09/01/2018       Patient is due for topics as listed above but is not proceeding with Immunization(s) Influenza at this time.            Last Clinic Visit: [facility] 9/30/22  NV: 3/31/23

## 2023-03-15 DIAGNOSIS — G40.909 SEIZURE DISORDER (H): ICD-10-CM

## 2023-03-16 NOTE — TELEPHONE ENCOUNTER
LEVETIRACETAM 750MG TABLET  Last Written Prescription Date:  3/8/2023  Last Fill Quantity: 120,   # refills: 0  Last Office Visit :  9/30/2022  Future Office visit:   3/31/2023  Routing refill request to provider for review/approval because:  Only 30 days sent to pharm last order.   Would Provider like a 90 day supply = 360 tabs with refills to cover a whole year.   Please review and send new order.       Ying Anthony RN  Central Triage Red Flags/Med Refills

## 2023-03-17 RX ORDER — LEVETIRACETAM 750 MG/1
1500 TABLET ORAL 2 TIMES DAILY
Qty: 360 TABLET | Refills: 3 | Status: SHIPPED | OUTPATIENT
Start: 2023-03-17 | End: 2024-01-05

## 2023-03-23 ENCOUNTER — TELEPHONE (OUTPATIENT)
Dept: NEUROLOGY | Facility: CLINIC | Age: 53
End: 2023-03-23
Payer: COMMERCIAL

## 2023-03-23 NOTE — TELEPHONE ENCOUNTER
Left Voicemail (1st Attempt) for the patient to call back and schedule the following:    Appointment type: Telephone Return  Provider: Dr. Key  Return date: next available  Specialty phone number: [phone]  Additional appointment(s) needed:   Additonal Notes:     Dr. Key is not available on 3/31/2023, appointment needs to be rescheduled.    Patient does not have MyChart, sent an appointment reschedule letter.    Danae R/Procedure    [facility]   Neurology, NeuroSurgery, NeuroPsychology and Pain Management Specialties  Medical/Surgical Adult Specialties

## 2023-03-24 DIAGNOSIS — G40.909 SEIZURE DISORDER (H): Primary | ICD-10-CM

## 2023-03-27 NOTE — TELEPHONE ENCOUNTER
Left Voicemail (2nd Attempt) for the patient to call back and schedule the following:    Appointment type: Return Virtual  Provider: Dr. Key  Return date: next available  Specialty phone number: [phone]  Additional appointment(s) needed:   Additonal Notes:     Dr. Key is not available on 3/31/2023, the appointment needs to be rescheduled.    Danae GARRETT/Procedure    [facility]   Neurology, NeuroSurgery, NeuroPsychology and Pain Management Specialties  Medical/Surgical Adult Specialties

## 2023-03-28 RX ORDER — PRIMIDONE 50 MG/1
TABLET ORAL
Qty: 225 TABLET | Refills: 3 | Status: SHIPPED | OUTPATIENT
Start: 2023-03-28 | End: 2024-01-05

## 2023-03-28 NOTE — TELEPHONE ENCOUNTER
Last Clinic Visit: [facility] 9/30/22  NV: 4/28/23  Filling per [facility] medication refill protocols - seizure medications.  Not all labs required.

## 2023-04-28 ENCOUNTER — VIRTUAL VISIT (OUTPATIENT)
Dept: NEUROLOGY | Facility: CLINIC | Age: 53
End: 2023-04-28
Payer: COMMERCIAL

## 2023-04-28 ENCOUNTER — TELEPHONE (OUTPATIENT)
Dept: NEUROLOGY | Facility: CLINIC | Age: 53
End: 2023-04-28

## 2023-04-28 DIAGNOSIS — G40.909 SEIZURE DISORDER (H): ICD-10-CM

## 2023-04-28 DIAGNOSIS — R56.9 FOCAL SEIZURES (H): Primary | ICD-10-CM

## 2023-04-28 PROCEDURE — 99214 OFFICE O/P EST MOD 30 MIN: CPT | Mod: VID | Performed by: PSYCHIATRY & NEUROLOGY

## 2023-04-28 RX ORDER — LAMOTRIGINE 200 MG/1
200 TABLET ORAL 2 TIMES DAILY
Qty: 180 TABLET | Refills: 3 | Status: SHIPPED | OUTPATIENT
Start: 2023-04-28 | End: 2024-01-05

## 2023-04-28 NOTE — PROGRESS NOTES
Johnson is a 52 year old who is being evaluated via a billable video visit.      How would you like to obtain your AVS? Mail a copy  If the video visit is dropped, the invitation should be resent by: Text to cell phone: [phone]  Will anyone else be joining your video visit? No      Video-Visit Details    Type of service:  Video Visit   Video Start Time:   Video End Time:    Originating Location (pt. Location): Home  Distant Location (provider location):  Off-site  Platform used for Video Visit: [facility], PATRICIA ([facility])     NEUROLOGY PROGRESS NOTE   [facility]    Patient:Johnson Crystal  : 1970  Age: 52 year old  Today's Virtual Visit: 2023    History of present illness:     Johnson is participating in this virtual visit for a follow up on his epilepsy.  He did not have any seizures since last visit 2022.       He is taking Depakote [id number], Keppra 1500 mg bid (750 mg tabs, 2 tabs) and  mg bid and primidone -100. (mainly for tremors). He denies side effects.     Social:  He is looking for a job.      ASD levels 2023  Depakote level 78  levetiracetam 39.5  Lamotrigine 14.6  Primidone <2.5    Current Outpatient Medications   Medication Sig Dispense Refill     amLODIPine (NORVASC) 5 MG tablet Take 5 mg by mouth       Calcium Carb-Cholecalciferol (CALCIUM CARBONATE-VITAMIN D3) 600-400 MG-UNIT TABS Take 1 tablet by mouth 2 times daily 180 tablet 3     diclofenac (VOLTAREN) 75 MG EC tablet Take 75 mg by mouth       divalproex sodium extended-release (DEPAKOTE ER) 500 MG 24 hr tablet Take 2 tabs in the morning and 3 tabs in the evening 450 tablet 3     lamoTRIgine (LAMICTAL) 200 MG tablet Take 1 tablet (200 mg) by mouth 2 times daily 180 tablet 3     levETIRAcetam (KEPPRA) 750 MG tablet Take 2 tablets (1,500 mg) by mouth 2 times daily 360 tablet 3     MELATONIN PO Take 5-10 mg by mouth       primidone (MYSOLINE) 50 MG tablet TAKE 1/2 TABLET BY MOUTH EVERY MORNING AND  TAKE 1 TABLET EVERY EVENING AND TAKE 1 TABLET EVERY NIGHT AT BEDTIME 225 tablet 3     Exam:    Wt Readings from Last 5 Encounters:   09/08/22 111.1 kg (245 lb)   01/16/20 104.4 kg (230 lb 3.2 oz)   07/15/19 101.9 kg (224 lb 11.2 oz)   01/07/19 101.6 kg (224 lb)   08/30/18 95.3 kg (210 lb)     Alert, awake, NAD, no aphasia or dysarthria, extra-occular movements intact, face symmetric, moves upper extremities against gravity, normal finger to nose, no dysmetria or tremors.     Assessment/Plan:    1. Idiopathic generalized epilepsy: the patient did not have any seizures that he or his wife are aware of.  Seizures are controlled on the current ASDs.      2. Tremors: His tremors have improved on primidone.        - Continue Depakote [id number],  Keppra 1500 mg bid and  mg bid    - Continue primidone -100 (mainly for tremors)    - Obtain ASD levels    - Follow up in 6 months       As described above, I met with the patient for 27 minutes and during this time counseling was greater than 50% of the visit time.  Leigh Ann Fong MD

## 2023-04-28 NOTE — LETTER
2023         RE: Johnson Crystal  [address]  [place] [address]        Dear Colleague,    Thank you for referring your patient, Johnson Crystal, to the [facility] NEUROLOGY CLINIC [place]. Please see a copy of my visit note below.       NEUROLOGY PROGRESS NOTE   [facility]    Patient:Johnson Crystal  : 1970  Age: 52 year old  Today's Virtual Visit: 2023    History of present illness:     Johnson is participating in this virtual visit for a follow up on his epilepsy.  He did not have any seizures since last visit 2022.       He is taking Depakote [id number], Keppra 1500 mg bid (750 mg tabs, 2 tabs) and  mg bid and primidone -100. (mainly for tremors). He denies side effects.     Social:  He is looking for a job.      ASD levels 2023  Depakote level 78  levetiracetam 39.5  Lamotrigine 14.6  Primidone <2.5    Current Outpatient Medications   Medication Sig Dispense Refill    amLODIPine (NORVASC) 5 MG tablet Take 5 mg by mouth      Calcium Carb-Cholecalciferol (CALCIUM CARBONATE-VITAMIN D3) 600-400 MG-UNIT TABS Take 1 tablet by mouth 2 times daily 180 tablet 3    diclofenac (VOLTAREN) 75 MG EC tablet Take 75 mg by mouth      divalproex sodium extended-release (DEPAKOTE ER) 500 MG 24 hr tablet Take 2 tabs in the morning and 3 tabs in the evening 450 tablet 3    lamoTRIgine (LAMICTAL) 200 MG tablet Take 1 tablet (200 mg) by mouth 2 times daily 180 tablet 3    levETIRAcetam (KEPPRA) 750 MG tablet Take 2 tablets (1,500 mg) by mouth 2 times daily 360 tablet 3    MELATONIN PO Take 5-10 mg by mouth      primidone (MYSOLINE) 50 MG tablet TAKE 1/2 TABLET BY MOUTH EVERY MORNING AND TAKE 1 TABLET EVERY EVENING AND TAKE 1 TABLET EVERY NIGHT AT BEDTIME 225 tablet 3     Exam:    Wt Readings from Last 5 Encounters:   22 111.1 kg (245 lb)   20 104.4 kg (230 lb 3.2 oz)   07/15/19 101.9 kg (224 lb 11.2 oz)   19 101.6 kg (224 lb)   18 95.3 kg (210  lb)     Alert, awake, NAD, no aphasia or dysarthria, extra-occular movements intact, face symmetric, moves upper extremities against gravity, normal finger to nose, no dysmetria or tremors.     Assessment/Plan:    1. Idiopathic generalized epilepsy: the patient did not have any seizures that he or his wife are aware of.  Seizures are controlled on the current ASDs.      2. Tremors: His tremors have improved on primidone.        - Continue Depakote [id number],  Keppra 1500 mg bid and  mg bid    - Continue primidone -100 (mainly for tremors)    - Obtain ASD levels    - Follow up in 6 months       As described above, I met with the patient for 27 minutes and during this time counseling was greater than 50% of the visit time.  Leigh Ann Fong MD

## 2023-04-28 NOTE — TELEPHONE ENCOUNTER
Writer mailed lab orders and AVS to patient at [address] [place] [address], [place] [address]  HARLEEN Torres., PATRICIA ([facility])

## 2023-06-01 ENCOUNTER — TELEPHONE (OUTPATIENT)
Dept: NEUROLOGY | Facility: CLINIC | Age: 53
End: 2023-06-01

## 2023-06-01 NOTE — TELEPHONE ENCOUNTER
What is the concern that needs to be addressed by a nurse?     Johnson SCHWAB Bonilla is calling stating that he needs a return to work note indicating any work restrictions and/or limitations.     May a detailed message be left on voicemail? YES    Date of last office visit: 04/28/2023    Message routed to: [facility] RN

## 2023-06-05 NOTE — TELEPHONE ENCOUNTER
What is the concern that needs to be addressed by a nurse? Patient is calling back requesting a return to work note with limitations and or restrictions     May a detailed message be left on voicemail?yes         Message routed to: [facility] RN pool

## 2023-06-23 ENCOUNTER — TELEPHONE (OUTPATIENT)
Dept: NEUROSURGERY | Facility: CLINIC | Age: 53
End: 2023-06-23
Payer: COMMERCIAL

## 2023-06-23 NOTE — TELEPHONE ENCOUNTER
I called patient to ask patient if he wants to reschedule his  Follow-up with Dr. Key, he is currently booked for a in clinic appt on a virtual day Friday, July 21,2023 . Pt also has Follow-up in October and he may want to just keep that one.

## 2023-09-05 DIAGNOSIS — M81.0 SENILE OSTEOPOROSIS: Primary | ICD-10-CM

## 2023-09-08 RX ORDER — CALCIUM CARBONATE/VITAMIN D3 600 MG-10
1 TABLET ORAL 2 TIMES DAILY
Qty: 180 TABLET | Refills: 3 | Status: SHIPPED | OUTPATIENT
Start: 2023-09-08 | End: 2024-09-11

## 2023-10-26 DIAGNOSIS — G40.909 SEIZURE DISORDER (H): ICD-10-CM

## 2023-10-27 RX ORDER — DIVALPROEX SODIUM 500 MG/1
TABLET, EXTENDED RELEASE ORAL
Qty: 450 TABLET | Refills: 0 | Status: SHIPPED | OUTPATIENT
Start: 2023-10-27 | End: 2023-11-09

## 2023-10-27 NOTE — TELEPHONE ENCOUNTER
DIVALPROEX SODIUM 500MG ER TAB     Take 2 tabs in the morning and 3 tabs in the evening   Last Written Prescription Date:  9/8/22  Last Fill Quantity: 450,   # refills: 3  Last Office Visit : 4/28/23  Continue Depakote [id number]   Future Office visit:  12/22/23 5/12/22 Valproic Acid  50 - 100 mcg/mL 78     Refilled per protocol.

## 2023-10-30 DIAGNOSIS — G40.909 SEIZURE DISORDER (H): ICD-10-CM

## 2023-10-30 RX ORDER — DIVALPROEX SODIUM 500 MG/1
TABLET, EXTENDED RELEASE ORAL
Qty: 450 TABLET | Refills: 0 | OUTPATIENT
Start: 2023-10-30

## 2023-11-06 DIAGNOSIS — G40.909 SEIZURE DISORDER (H): ICD-10-CM

## 2023-11-09 RX ORDER — DIVALPROEX SODIUM 500 MG/1
TABLET, EXTENDED RELEASE ORAL
Qty: 450 TABLET | Refills: 3 | Status: SHIPPED | OUTPATIENT
Start: 2023-11-09

## 2023-11-24 ENCOUNTER — TELEPHONE (OUTPATIENT)
Dept: NEUROLOGY | Facility: CLINIC | Age: 53
End: 2023-11-24

## 2023-11-24 NOTE — TELEPHONE ENCOUNTER
[facility] Call Center    Phone Message    May a detailed message be left on voicemail: yes     Reason for Call: Other: Patient is calling to discuss going back to work. States he has been out of work for the last 5 years. States he would like a call back to discuss at [phone]     Action Taken: Other: [facility]    Travel Screening: Not Applicable

## 2023-11-27 NOTE — TELEPHONE ENCOUNTER
Called and left message for patient to call back. Want to let him know that he should discuss his previous message with Dr. Key in his appointment with her on 12/22.

## 2023-12-22 ENCOUNTER — VIRTUAL VISIT (OUTPATIENT)
Dept: NEUROLOGY | Facility: CLINIC | Age: 53
End: 2023-12-22
Payer: COMMERCIAL

## 2023-12-22 DIAGNOSIS — Z53.9 NO SHOW: Primary | ICD-10-CM

## 2023-12-22 PROCEDURE — 99207 PR NO CHARGE LOS: CPT | Mod: VID | Performed by: PSYCHIATRY & NEUROLOGY

## 2023-12-22 NOTE — LETTER
12/22/2023         RE: Johnson Crystal  [address] [address]        Dear Colleague,    Thank you for referring your patient, Johnson Crystal, to the [facility] NEUROLOGY CLINIC [place]. Please see a copy of my visit note below.    No show.    Again, thank you for allowing me to participate in the care of your patient.        Sincerely,        Leigh Ann Fong MD

## 2024-01-05 ENCOUNTER — OFFICE VISIT (OUTPATIENT)
Dept: NEUROLOGY | Facility: CLINIC | Age: 54
End: 2024-01-05
Payer: COMMERCIAL

## 2024-01-05 ENCOUNTER — TELEPHONE (OUTPATIENT)
Dept: NEUROLOGY | Facility: CLINIC | Age: 54
End: 2024-01-05

## 2024-01-05 VITALS
BODY MASS INDEX: 35 KG/M2 | DIASTOLIC BLOOD PRESSURE: 76 MMHG | HEIGHT: 71 IN | SYSTOLIC BLOOD PRESSURE: 122 MMHG | HEART RATE: 66 BPM | WEIGHT: 250 LBS

## 2024-01-05 DIAGNOSIS — G40.309 GENERALIZED EPILEPSY (H): Primary | ICD-10-CM

## 2024-01-05 DIAGNOSIS — G40.909 SEIZURE DISORDER (H): ICD-10-CM

## 2024-01-05 LAB
ALT SERPL W P-5'-P-CCNC: 23 U/L (ref 0–70)
AST SERPL W P-5'-P-CCNC: 32 U/L (ref 0–45)
LEVETIRACETAM SERPL-MCNC: 23.3 ΜG/ML (ref 10–40)

## 2024-01-05 PROCEDURE — 36415 COLL VENOUS BLD VENIPUNCTURE: CPT | Performed by: PSYCHIATRY & NEUROLOGY

## 2024-01-05 PROCEDURE — 80165 DIPROPYLACETIC ACID FREE: CPT | Mod: 90 | Performed by: PSYCHIATRY & NEUROLOGY

## 2024-01-05 PROCEDURE — 80184 ASSAY OF PHENOBARBITAL: CPT | Mod: 90 | Performed by: PSYCHIATRY & NEUROLOGY

## 2024-01-05 PROCEDURE — 84450 TRANSFERASE (AST) (SGOT): CPT | Performed by: PSYCHIATRY & NEUROLOGY

## 2024-01-05 PROCEDURE — 80164 ASSAY DIPROPYLACETIC ACD TOT: CPT | Mod: 90 | Performed by: PSYCHIATRY & NEUROLOGY

## 2024-01-05 PROCEDURE — 84460 ALANINE AMINO (ALT) (SGPT): CPT | Performed by: PSYCHIATRY & NEUROLOGY

## 2024-01-05 PROCEDURE — 80177 DRUG SCRN QUAN LEVETIRACETAM: CPT | Performed by: PSYCHIATRY & NEUROLOGY

## 2024-01-05 PROCEDURE — 80188 ASSAY OF PRIMIDONE: CPT | Mod: 90 | Performed by: PSYCHIATRY & NEUROLOGY

## 2024-01-05 PROCEDURE — 99000 SPECIMEN HANDLING OFFICE-LAB: CPT | Performed by: PSYCHIATRY & NEUROLOGY

## 2024-01-05 PROCEDURE — 99213 OFFICE O/P EST LOW 20 MIN: CPT | Performed by: PSYCHIATRY & NEUROLOGY

## 2024-01-05 PROCEDURE — 80175 DRUG SCREEN QUAN LAMOTRIGINE: CPT | Mod: 90 | Performed by: PSYCHIATRY & NEUROLOGY

## 2024-01-05 RX ORDER — PRIMIDONE 50 MG/1
TABLET ORAL
Qty: 225 TABLET | Refills: 3 | Status: SHIPPED | OUTPATIENT
Start: 2024-01-05

## 2024-01-05 RX ORDER — LEVETIRACETAM 750 MG/1
1500 TABLET ORAL 2 TIMES DAILY
Qty: 360 TABLET | Refills: 3 | Status: SHIPPED | OUTPATIENT
Start: 2024-01-05

## 2024-01-05 RX ORDER — LAMOTRIGINE 200 MG/1
200 TABLET ORAL 2 TIMES DAILY
Qty: 180 TABLET | Refills: 3 | Status: SHIPPED | OUTPATIENT
Start: 2024-01-05

## 2024-01-05 ASSESSMENT — PATIENT HEALTH QUESTIONNAIRE - PHQ9: SUM OF ALL RESPONSES TO PHQ QUESTIONS 1-9: 15

## 2024-01-05 NOTE — PROGRESS NOTES
NEUROLOGY PROGRESS NOTE   [facility]    Patient:Johnson Crystal  : 1970  Age: 53 year old  Today's Office Visit: 2024    Epilepsy Data:  Patient History  Primary Epileptologist/Provider: Leigh Ann Fong M.D.  Epilepsy Syndrome: Generalized Epilepsy unspecified  Age of Onset: childhood  Etiology: Idiopathic      Tests/Surgery History  Last EE19  Last MRI: 4/10/17     Seizure Record  Current Visit Date: 24  Previous Visit Date: 22  Seizure Type 1: Tonic-clonic seizures  Description of Sz Type 1:0  Seizure Type 2: Unspecified Staring Spell    History of present illness:     Johnson presents to the clinic for a follow-up on his idiopathic generalized epilepsy.  He was last seen 2022.  He did not have any seizures since last visit.       He is taking Depakote [id number], Keppra 1500 mg bid and  mg bid and primidone -100(mainly for tremors).  He feels tired after taking medications.  It helps if he does an activity, otherwise, he falls asleep.      His tremors are better on primidone. He notes he missed a dose when his tremors get worse.     He started working couple weeks ago. He works in [employer], pays a lot less than welding. He has some financial issues that causes stress. He lost his father on New year.  He feels depressed.  Last time, I advised him to see a psychiatrist, but he has not seen one.    ASD levels 2022:    Depakote 78/12  Keppra 39.5  Lamotrigine 14.6  Primidone  <2.5    Current Outpatient Medications   Medication Sig Dispense Refill    amLODIPine (NORVASC) 5 MG tablet Take 5 mg by mouth      calcium carbonate-vitamin D (CALTRATE) 600-10 MG-MCG per tablet Take 1 tablet by mouth 2 times daily 180 tablet 3    diclofenac (VOLTAREN) 75 MG EC tablet Take 75 mg by mouth      divalproex sodium extended-release (DEPAKOTE ER) 500 MG 24 hr tablet TAKE 2 TABLETS BY MOUTH IN THE MORNING AND TAKE 3 TABLETS IN THE EVENING 450 tablet 3    lamoTRIgine  "(LAMICTAL) 200 MG tablet Take 1 tablet (200 mg) by mouth 2 times daily 180 tablet 3    levETIRAcetam (KEPPRA) 750 MG tablet Take 2 tablets (1,500 mg) by mouth 2 times daily 360 tablet 3    MELATONIN PO Take 5-10 mg by mouth      primidone (MYSOLINE) 50 MG tablet TAKE 1/2 TABLET BY MOUTH EVERY MORNING AND TAKE 1 TABLET EVERY EVENING AND TAKE 1 TABLET EVERY NIGHT AT BEDTIME 225 tablet 3     Exam:    /76   Pulse 66   Ht 1.803 m (5' 11\")   Wt 113.4 kg (250 lb)   BMI 34.87 kg/m       Wt Readings from Last 5 Encounters:   01/05/24 113.4 kg (250 lb)   09/08/22 111.1 kg (245 lb)   01/16/20 104.4 kg (230 lb 3.2 oz)   07/15/19 101.9 kg (224 lb 11.2 oz)   01/07/19 101.6 kg (224 lb)     General Appearance: Alert, awake, cooperative, pleasant, NAD  Gait and tandem gait: steady  Attention Span:  Normal  Language/speech: no aphasia or dysarthria  Extraocular Movements:  Normal  Coordination:  Normal finger to nose, slight low amplitude high frequency tremors in right hand in outstretched arms.    Facial Strength:  Normal  Motor Exam: normal tone, bulk and strength 5/5 bilaterally    Assessment/Plan:    1. Idiopathic generalized epilepsy: Seizures are controlled on the current ASDs. ASD levels were within expected range.     2. Tremors:  He is taking primidone -100.  It is helpful, he notes his tremors worsen if he forgets taking his medication.      2.  Depression: The patient has been feeling depressed and I referred him to a psychiatrist previously, however, he hasn't made an appointment. I advised him to make the appointment.     - Continue Depakote 3935-3029, Keppra 1500 mg bid and  mg bid  - Continue primidone -100  - Obtain ASD levels and LFT  - Follow up in 6 months      As described above, I met with the patient for 25 minutes and during this time counseling was greater than 50% of the visit time.  Leigh Ann Fong MD    "

## 2024-01-05 NOTE — TELEPHONE ENCOUNTER
Left Voicemail (1st Attempt) for the patient to call back and schedule the following:    Appointment type: Return Seizure/Neuro  Provider: Dr. Key  Return date: 7/5/2024  Specialty phone number: [phone]  Additional appointment(s) needed:   Additonal Notes:     Patient does not  have MyChart. Attempted to reach the patient and schedule a 6 month follow up with Dr. Key.    Danae R/Procedure    [facility]   Neurology, NeuroSurgery, NeuroPsychology, Pain Management and Cardiology Specialties  Medical/Surgical Adult Specialties

## 2024-01-05 NOTE — NURSING NOTE
Depression Response    Patient completed the PHQ-9 assessment for depression and scored >9? Yes  Question 9 on the PHQ-9 was positive for suicidality? No  Is the patient already receiving treatment for depression? No    Is this a virtual visit?  No    I personally notified the following: visit provider

## 2024-01-05 NOTE — LETTER
2024         RE: Johnson Crystal  [address]  [place] [address]        Dear Colleague,    Thank you for referring your patient, Johnson Crsytal, to the [facility] [place]. Please see a copy of my visit note below.     NEUROLOGY PROGRESS NOTE   [facility]    Patient:Johnson Crystal  : 1970  Age: 53 year old  Today's Office Visit: 2024    Epilepsy Data:  Patient History  Primary Epileptologist/Provider: Leigh Ann Fong M.D.  Epilepsy Syndrome: Generalized Epilepsy unspecified  Age of Onset: childhood  Etiology: Idiopathic      Tests/Surgery History  Last EE19  Last MRI: 4/10/17     Seizure Record  Current Visit Date: 24  Previous Visit Date: 22  Seizure Type 1: Tonic-clonic seizures  Description of Sz Type 1:0  Seizure Type 2: Unspecified Staring Spell    History of present illness:     Johnson presents to the clinic for a follow-up on his idiopathic generalized epilepsy.  He was last seen 2022.  He did not have any seizures since last visit.       He is taking Depakote [id number], Keppra 1500 mg bid and  mg bid and primidone -100(mainly for tremors).  He feels tired after taking medications.  It helps if he does an activity, otherwise, he falls asleep.      His tremors are better on primidone. He notes he missed a dose when his tremors get worse.     He started working couple weeks ago. He works in [employer], pays a lot less than welding. He has some financial issues that causes stress. He lost his father on New year.  He feels depressed.  Last time, I advised him to see a psychiatrist, but he has not seen one.    ASD levels 2022:    Depakote 78/12  Keppra 39.5  Lamotrigine 14.6  Primidone  <2.5    Current Outpatient Medications   Medication Sig Dispense Refill    amLODIPine (NORVASC) 5 MG tablet Take 5 mg by mouth      calcium carbonate-vitamin D (CALTRATE) 600-10 MG-MCG per tablet Take 1 tablet by mouth 2  "times daily 180 tablet 3    diclofenac (VOLTAREN) 75 MG EC tablet Take 75 mg by mouth      divalproex sodium extended-release (DEPAKOTE ER) 500 MG 24 hr tablet TAKE 2 TABLETS BY MOUTH IN THE MORNING AND TAKE 3 TABLETS IN THE EVENING 450 tablet 3    lamoTRIgine (LAMICTAL) 200 MG tablet Take 1 tablet (200 mg) by mouth 2 times daily 180 tablet 3    levETIRAcetam (KEPPRA) 750 MG tablet Take 2 tablets (1,500 mg) by mouth 2 times daily 360 tablet 3    MELATONIN PO Take 5-10 mg by mouth      primidone (MYSOLINE) 50 MG tablet TAKE 1/2 TABLET BY MOUTH EVERY MORNING AND TAKE 1 TABLET EVERY EVENING AND TAKE 1 TABLET EVERY NIGHT AT BEDTIME 225 tablet 3     Exam:    /76   Pulse 66   Ht 1.803 m (5' 11\")   Wt 113.4 kg (250 lb)   BMI 34.87 kg/m       Wt Readings from Last 5 Encounters:   01/05/24 113.4 kg (250 lb)   09/08/22 111.1 kg (245 lb)   01/16/20 104.4 kg (230 lb 3.2 oz)   07/15/19 101.9 kg (224 lb 11.2 oz)   01/07/19 101.6 kg (224 lb)     General Appearance: Alert, awake, cooperative, pleasant, NAD  Gait and tandem gait: steady  Attention Span:  Normal  Language/speech: no aphasia or dysarthria  Extraocular Movements:  Normal  Coordination:  Normal finger to nose, slight low amplitude high frequency tremors in right hand in outstretched arms.    Facial Strength:  Normal  Motor Exam: normal tone, bulk and strength 5/5 bilaterally    Assessment/Plan:    1. Idiopathic generalized epilepsy: Seizures are controlled on the current ASDs. ASD levels were within expected range.     2. Tremors:  He is taking primidone -100.  It is helpful, he notes his tremors worsen if he forgets taking his medication.      2.  Depression: The patient has been feeling depressed and I referred him to a psychiatrist previously, however, he hasn't made an appointment. I advised him to make the appointment.     - Continue Depakote [id number], Keppra 1500 mg bid and  mg bid  - Continue primidone -100  - Obtain ASD levels and " LFT  - Follow up in 6 months      As described above, I met with the patient for 25 minutes and during this time counseling was greater than 50% of the visit time.  Leigh Ann Fong MD

## 2024-01-06 LAB
LAMOTRIGINE SERPL-MCNC: 14.3 UG/ML
PHENOBARB SERPL-MCNC: 1.3 UG/ML
PRIMIDONE SERPL-MCNC: <2.5 UG/ML

## 2024-01-08 LAB
VALPROATE FREE MFR SERPL: 21 %
VALPROATE FREE SERPL-MCNC: 22 UG/ML
VALPROATE SERPL-MCNC: 109 UG/ML

## 2024-02-05 NOTE — TELEPHONE ENCOUNTER
Left Voicemail (2nd Attempt) for the patient to call back and schedule the following:    Appointment type: Return Seizure  Provider: Dr. Key  Return date: 7/5/2024  Specialty phone number: [phone]  Additional appointment(s) needed:   Additonal Notes:     2nd attempt to reach the patient, does not have MyChart, sent an appointment request letter.    Danae GARRETT/Complex Procedure    [facility]   Neurology, NeuroSurgery, NeuroPsychology, Pain Management and Cardiology Specialties  Medical/Surgical Adult Specialties

## 2024-09-07 DIAGNOSIS — M81.0 SENILE OSTEOPOROSIS: ICD-10-CM

## 2024-09-11 RX ORDER — CALCIUM CARBONATE/VITAMIN D3 600 MG-10
1 TABLET ORAL 2 TIMES DAILY
Qty: 180 TABLET | Refills: 1 | Status: SHIPPED | OUTPATIENT
Start: 2024-09-11

## 2024-09-11 NOTE — TELEPHONE ENCOUNTER
LVD:   1/5/2024  [facility] Leigh Ann Verdugo MD     Refilled per protocol.

## 2024-10-12 NOTE — TELEPHONE ENCOUNTER
DIVALPROEX SODIUM 500MG ER TAB   Last Written Prescription Date:  10/27/2023  Last Fill Quantity: 450,   # refills: 0  Last Office Visit : 4/28/2023  Future Office visit:  12/22/2023  Routing refill request to provider for review/approval because:  Pt has follow up visit in December was seen back in April 2023.  Pharmacy asking for advance refills for Pt care.  Okay to give extra refills for Pt care.    Several request from pharmacy.  Please review and fill per Providers orders for Pt care.  Thank you         Ying Anthony RN  Central Triage Red Flags/Med Refills    
Yes

## 2024-11-10 DIAGNOSIS — G40.909 SEIZURE DISORDER (H): Primary | ICD-10-CM

## 2024-11-13 RX ORDER — DIVALPROEX SODIUM 500 MG/1
TABLET, FILM COATED, EXTENDED RELEASE ORAL
Qty: 450 TABLET | Refills: 0 | Status: SHIPPED | OUTPATIENT
Start: 2024-11-13

## 2025-02-15 DIAGNOSIS — G40.909 SEIZURE DISORDER (H): ICD-10-CM

## 2025-02-19 RX ORDER — LEVETIRACETAM 750 MG/1
1500 TABLET ORAL 2 TIMES DAILY
Qty: 120 TABLET | Refills: 0 | Status: SHIPPED | OUTPATIENT
Start: 2025-02-19

## 2025-02-20 ENCOUNTER — TELEPHONE (OUTPATIENT)
Dept: NEUROLOGY | Facility: CLINIC | Age: 55
End: 2025-02-20

## 2025-02-20 NOTE — TELEPHONE ENCOUNTER
Last Written Prescription:  1/20/25   120  ----------------------  Last Visit Date: 1/5/24   Future Visit Date: none  RTC   6 MOS  ----------------------    [x]  Refill decision: Medication refilled per  Medication Refill in Ambulatory Care  policy.   30 day refill  / appt      [x]  Supervision: no future appointment scheduled. Scheduling has been notified to contact the pt for appointment.    Request from pharmacy:  Requested Prescriptions   Pending Prescriptions Disp Refills    levETIRAcetam (KEPPRA) 750 MG tablet [Pharmacy Med Name: LEVETIRACETAM 750MG TABLET] 120 tablet 0     Sig: TAKE 2 TABLETS (1,500 MG) BY MOUTH 2 TIMES DAILY.       There is no refill protocol information for this order

## 2025-03-02 DIAGNOSIS — G40.909 SEIZURE DISORDER (H): ICD-10-CM

## 2025-03-04 RX ORDER — DIVALPROEX SODIUM 500 MG/1
TABLET, FILM COATED, EXTENDED RELEASE ORAL
Qty: 450 TABLET | Refills: 0 | Status: SHIPPED | OUTPATIENT
Start: 2025-03-04

## 2025-03-04 NOTE — TELEPHONE ENCOUNTER
Last Written Prescription: DIVALPROEX SODIUM 500MG ER TAB      divalproex sodium extended-release (DEPAKOTE ER) 500 MG 24 hr tablet 450 tablet 0 11/13/2024 -- No   Sig: TAKE 2 TABLETS BY MOUTH IN THE MORNING AND TAKE 3 TABLETS IN THE EVENING     ----------------------  Last Visit Date: 1/5/24  Future Visit Date: 5/13/25 Yesi Lawson  ----------------------  Care Everywhere:  AST done 6/24/24 outside (28)  AED done 6/24/24:   Valproic Acid  50 - 100 mcg/mL 94     Refill decision: Medication refilled per  Medication Refill in Ambulatory Care  policy.90 refill given. Labs are up to date and next appt 5/13/25     Overridden by Leigh Ann Fong MD on Feb 28, 2025 5:03 PM   Drug-Drug   1. VALPROIC ACID / LAMOTRIGINE [Level: Major]   Other Orders: lamoTRIgine (LAMICTAL) 200 MG tablet      2. BARBITURATES / VALPROIC ACID [Level: Moderate]   Other Orders: primidone (MYSOLINE) 50 MG tablet            Request from pharmacy:  Requested Prescriptions   Pending Prescriptions Disp Refills    divalproex sodium extended-release (DEPAKOTE ER) 500 MG 24 hr tablet [Pharmacy Med Name: DIVALPROEX SODIUM 500MG ER TAB] 450 tablet 0     Sig: TAKE 2 TABLETS BY MOUTH IN THE MORNING AND TAKE 3 TABLETS IN THE EVENING

## 2025-03-19 DIAGNOSIS — M81.0 SENILE OSTEOPOROSIS: Primary | ICD-10-CM

## 2025-03-24 DIAGNOSIS — G40.909 SEIZURE DISORDER (H): ICD-10-CM

## 2025-03-25 RX ORDER — CALCIUM CARBONATE/VITAMIN D3 600 MG-10
1 TABLET ORAL 2 TIMES DAILY
Qty: 180 TABLET | Refills: 1 | Status: SHIPPED | OUTPATIENT
Start: 2025-03-25

## 2025-05-13 ENCOUNTER — VIRTUAL VISIT (OUTPATIENT)
Dept: NEUROLOGY | Facility: CLINIC | Age: 55
End: 2025-05-13
Payer: COMMERCIAL

## 2025-05-13 DIAGNOSIS — G40.909 SEIZURE DISORDER (H): ICD-10-CM

## 2025-05-13 RX ORDER — LAMOTRIGINE 200 MG/1
200 TABLET ORAL 2 TIMES DAILY
Qty: 180 TABLET | Refills: 3 | Status: SHIPPED | OUTPATIENT
Start: 2025-05-13

## 2025-05-13 RX ORDER — LEVETIRACETAM 750 MG/1
1500 TABLET ORAL 2 TIMES DAILY
Qty: 360 TABLET | Refills: 3 | Status: SHIPPED | OUTPATIENT
Start: 2025-05-13

## 2025-05-13 RX ORDER — ROSUVASTATIN CALCIUM 20 MG/1
TABLET, COATED ORAL
COMMUNITY

## 2025-05-13 RX ORDER — PRIMIDONE 50 MG/1
TABLET ORAL
Qty: 225 TABLET | Refills: 3 | Status: SHIPPED | OUTPATIENT
Start: 2025-05-13

## 2025-05-13 RX ORDER — DIVALPROEX SODIUM 500 MG/1
TABLET, FILM COATED, EXTENDED RELEASE ORAL
Qty: 450 TABLET | Refills: 3 | Status: SHIPPED | OUTPATIENT
Start: 2025-05-13

## 2025-05-13 NOTE — NURSING NOTE
Is the patient currently in the state of [place]? YES    Visit mode:VIDEO    If the visit is dropped, the patient can be reconnected by: TELEPHONE VISIT: Phone number: [phone]    Will anyone else be joining the visit? No      How would you like to obtain your AVS? Mail a copy    Are changes needed to the allergy or medication list? NO    Reason for visit: Follow Up      Virtual Visit Details    Type of service:  Video Visit   Originating Location (pt. Location): Home    Distant Location (provider location):  On-site  Platform used for Video Visit: Juan      ** Needs refill urgent Lamotrigine.

## 2025-05-13 NOTE — LETTER
2025       RE: Johnson Crystal  : 1970   MRN: [id number]      Dear Colleague,    Thank you for referring your patient, Johnson Crystal, to the [facility] EPILEPSY CARE at [facility]. Please see a copy of my visit note below.    [facility] Epilepsy [facility] Progress Note      Patient:  Johnson Crystal  :  1970   Age:  54 year old   Today's virtual visit:  2025    Epilepsy Data:      Patient History  Primary Epileptologist/Provider: Leigh Ann Fong M.D.  Epilepsy Syndrome: Idiopathic Generalized Epilepsy   Age of Onset: childhood     Tests/Surgery History  Last EE19  Last MRI: 4/10/17     Seizure Record  Current Visit Date: 25  Previous Visit Date: 24  Seizure Type 1: Tonic-clonic seizures  Number of Sz Type 1:0  Seizure Type 2: Unspecified Staring Spell  , Not seizure type II:0     History of present illness:      Johnson is participating in this virtual visit for a follow-up on his idiopathic generalized epilepsy.  He was last seen 2022.  He did not have any seizures since last visit.      He is taking Depakote [id number], Keppra 1500 mg bid and  mg bid and primidone -100(mainly for tremors).  He feels tired after taking medications.  It helps if he does an activity, otherwise, he falls asleep.      Johnson experiences tremors and is taking primidone for management. He notices the tremors if he does not take the medication for over 12 to 15 hours, which reminds him to take his medication.    ASD levels 24  Keppra 35.2  Depakote 94    Social history: He was working in [employer].  He also works part-time at a new job making [employer], which has helped reduce financial stress and providing for his family.       Current Outpatient Medications   Medication Sig Dispense Refill     amLODIPine (NORVASC) 5 MG tablet Take 5 mg by mouth daily.       calcium carbonate-vitamin D (CALTRATE) 600-10  MG-MCG per tablet Take 1 tablet by mouth 2 times daily. 180 tablet 1     divalproex sodium extended-release (DEPAKOTE ER) 500 MG 24 hr tablet TAKE 2 TABLETS BY MOUTH IN THE MORNING AND TAKE 3 TABLETS IN THE EVENING 450 tablet 0     lamoTRIgine (LAMICTAL) 200 MG tablet Take 1 tablet (200 mg) by mouth 2 times daily. 60 tablet 2     levETIRAcetam (KEPPRA) 750 MG tablet TAKE 2 TABLETS (1,500 MG) BY MOUTH 2 TIMES DAILY. 120 tablet 5     primidone (MYSOLINE) 50 MG tablet TAKE 1/2 TABLET BY MOUTH EVERY MORNING AND TAKE 1 TABLET EVERY EVENING AND TAKE 1 TABLET EVERY NIGHT AT BEDTIME 75 tablet 2     rosuvastatin (CRESTOR) 20 MG tablet TAKE 1 TABLET (20 MG) BY MOUTH EVERY EVENING FOR CHOLESTEROL       diclofenac (VOLTAREN) 75 MG EC tablet Take 75 mg by mouth (Patient not taking: Reported on 5/13/2025)       MELATONIN PO Take 5-10 mg by mouth (Patient not taking: Reported on 5/13/2025)          Medication Notes:        AED Medication Compliance:  compliant most of the time  Using a pill box:  Yes    Other Issues:    Is patient safe to drive:  Yes    Exam:    There were no vitals taken for this visit.     Wt Readings from Last 5 Encounters:   01/05/24 113.4 kg (250 lb)   09/08/22 111.1 kg (245 lb)   01/16/20 104.4 kg (230 lb 3.2 oz)   07/15/19 101.9 kg (224 lb 11.2 oz)   01/07/19 101.6 kg (224 lb)     Alert, awake, NAD, EOMI, face is symmetric, moves upper extremities against gravity.    Assessment and Plan:   1. Idiopathic generalized epilepsy: Seizures are controlled on the current ASDs. ASD levels were within expected range.  Denies side effects.     2. Tremors:  He is taking primidone -100.  It is helpful, he notes his tremors worsen if he forgets taking his medication.         - Continue Depakote [id number], Keppra 1500 mg bid and  mg bid  - Continue primidone -100  - Follow up in 6 months      As described above, I met with the patient for 10 minutes and during this time counseling was greater than 50%  of the visit time.  I spent an additional 8 minutes on chart review and documentation.  Leigh Ann Fong MD                        Again, thank you for allowing me to participate in the care of your patient.      Sincerely,    Leigh Ann Fong MD

## 2025-05-13 NOTE — PROGRESS NOTES
[facility] Epilepsy Care Progress Note      Patient:  Johnson Crystal  :  1970   Age:  54 year old   Today's virtual visit:  2025    Epilepsy Data:      Patient History  Primary Epileptologist/Provider: Leigh Ann Fong M.D.  Epilepsy Syndrome: Idiopathic Generalized Epilepsy   Age of Onset: childhood     Tests/Surgery History  Last EE19  Last MRI: 4/10/17     Seizure Record  Current Visit Date: 25  Previous Visit Date: 24  Seizure Type 1: Tonic-clonic seizures  Number of Sz Type 1:0  Seizure Type 2: Unspecified Staring Spell  , Not seizure type II:0     History of present illness:      Johnson is participating in this virtual visit for a follow-up on his idiopathic generalized epilepsy.  He was last seen 2022.  He did not have any seizures since last visit.      He is taking Depakote [id number], Keppra 1500 mg bid and  mg bid and primidone -100(mainly for tremors).  He feels tired after taking medications.  It helps if he does an activity, otherwise, he falls asleep.      Johnson experiences tremors and is taking primidone for management. He notices the tremors if he does not take the medication for over 12 to 15 hours, which reminds him to take his medication.    ASD levels 24  Keppra 35.2  Depakote 94    Social history: He was working in [employer].  He also works part-time at a new job making [employer], which has helped reduce financial stress and providing for his family.       Current Outpatient Medications   Medication Sig Dispense Refill    amLODIPine (NORVASC) 5 MG tablet Take 5 mg by mouth daily.      calcium carbonate-vitamin D (CALTRATE) 600-10 MG-MCG per tablet Take 1 tablet by mouth 2 times daily. 180 tablet 1    divalproex sodium extended-release (DEPAKOTE ER) 500 MG 24 hr tablet TAKE 2 TABLETS BY MOUTH IN THE MORNING AND TAKE 3 TABLETS IN THE EVENING 450 tablet 0    lamoTRIgine (LAMICTAL) 200 MG tablet Take 1 tablet (200 mg) by mouth 2  times daily. 60 tablet 2    levETIRAcetam (KEPPRA) 750 MG tablet TAKE 2 TABLETS (1,500 MG) BY MOUTH 2 TIMES DAILY. 120 tablet 5    primidone (MYSOLINE) 50 MG tablet TAKE 1/2 TABLET BY MOUTH EVERY MORNING AND TAKE 1 TABLET EVERY EVENING AND TAKE 1 TABLET EVERY NIGHT AT BEDTIME 75 tablet 2    rosuvastatin (CRESTOR) 20 MG tablet TAKE 1 TABLET (20 MG) BY MOUTH EVERY EVENING FOR CHOLESTEROL      diclofenac (VOLTAREN) 75 MG EC tablet Take 75 mg by mouth (Patient not taking: Reported on 5/13/2025)      MELATONIN PO Take 5-10 mg by mouth (Patient not taking: Reported on 5/13/2025)          Medication Notes:        AED Medication Compliance:  compliant most of the time  Using a pill box:  Yes    Other Issues:    Is patient safe to drive:  Yes    Exam:    There were no vitals taken for this visit.     Wt Readings from Last 5 Encounters:   01/05/24 113.4 kg (250 lb)   09/08/22 111.1 kg (245 lb)   01/16/20 104.4 kg (230 lb 3.2 oz)   07/15/19 101.9 kg (224 lb 11.2 oz)   01/07/19 101.6 kg (224 lb)     Alert, awake, NAD, EOMI, face is symmetric, moves upper extremities against gravity.    Assessment and Plan:   1. Idiopathic generalized epilepsy: Seizures are controlled on the current ASDs. ASD levels were within expected range.  Denies side effects.     2. Tremors:  He is taking primidone -100.  It is helpful, he notes his tremors worsen if he forgets taking his medication.         - Continue Depakote [id number], Keppra 1500 mg bid and  mg bid  - Continue primidone -100  - Follow up in 6 months      As described above, I met with the patient for 10 minutes and during this time counseling was greater than 50% of the visit time.  I spent an additional 8 minutes on chart review and documentation.  Leigh Ann Fong MD

## 2025-08-27 DIAGNOSIS — M81.0 SENILE OSTEOPOROSIS: ICD-10-CM

## 2025-08-29 DIAGNOSIS — M81.0 SENILE OSTEOPOROSIS: ICD-10-CM

## 2025-08-30 RX ORDER — CALCIUM CARBONATE/VITAMIN D3 600 MG-10
1 TABLET ORAL
Qty: 180 TABLET | Refills: 1 | Status: SHIPPED | OUTPATIENT
Start: 2025-08-30

## 2025-09-02 RX ORDER — ROSUVASTATIN CALCIUM 20 MG/1
TABLET, COATED ORAL
OUTPATIENT
Start: 2025-09-02

## 2025-09-02 RX ORDER — CALCIUM CARBONATE/VITAMIN D3 600 MG-10
1 TABLET ORAL 2 TIMES DAILY
Qty: 180 TABLET | Refills: 1 | OUTPATIENT
Start: 2025-09-02